# Patient Record
Sex: MALE | Race: WHITE | NOT HISPANIC OR LATINO | Employment: FULL TIME | URBAN - METROPOLITAN AREA
[De-identification: names, ages, dates, MRNs, and addresses within clinical notes are randomized per-mention and may not be internally consistent; named-entity substitution may affect disease eponyms.]

---

## 2021-04-12 ENCOUNTER — OFFICE VISIT (OUTPATIENT)
Dept: FAMILY MEDICINE | Facility: CLINIC | Age: 66
End: 2021-04-12
Payer: COMMERCIAL

## 2021-04-12 VITALS
BODY MASS INDEX: 44.1 KG/M2 | HEART RATE: 66 BPM | DIASTOLIC BLOOD PRESSURE: 90 MMHG | HEIGHT: 71 IN | SYSTOLIC BLOOD PRESSURE: 148 MMHG | WEIGHT: 315 LBS | OXYGEN SATURATION: 96 %

## 2021-04-12 DIAGNOSIS — E66.9 OBESITY, UNSPECIFIED CLASSIFICATION, UNSPECIFIED OBESITY TYPE, UNSPECIFIED WHETHER SERIOUS COMORBIDITY PRESENT: ICD-10-CM

## 2021-04-12 DIAGNOSIS — R60.0 BILATERAL LOWER EXTREMITY EDEMA: ICD-10-CM

## 2021-04-12 DIAGNOSIS — G47.33 OSA ON CPAP: ICD-10-CM

## 2021-04-12 DIAGNOSIS — Z83.3 FAMILY HISTORY OF DIABETES MELLITUS (DM): ICD-10-CM

## 2021-04-12 DIAGNOSIS — Z00.00 HEALTHCARE MAINTENANCE: ICD-10-CM

## 2021-04-12 DIAGNOSIS — Z12.11 COLON CANCER SCREENING: ICD-10-CM

## 2021-04-12 DIAGNOSIS — R03.0 ELEVATED BLOOD PRESSURE READING IN OFFICE WITHOUT DIAGNOSIS OF HYPERTENSION: ICD-10-CM

## 2021-04-12 DIAGNOSIS — Z82.49 FAMILY HISTORY OF HEART DISEASE: ICD-10-CM

## 2021-04-12 DIAGNOSIS — Z23 NEED FOR DIPHTHERIA-TETANUS-PERTUSSIS (TDAP) VACCINE: ICD-10-CM

## 2021-04-12 DIAGNOSIS — Z76.89 ENCOUNTER TO ESTABLISH CARE WITH NEW DOCTOR: ICD-10-CM

## 2021-04-12 DIAGNOSIS — Z12.5 PROSTATE CANCER SCREENING: ICD-10-CM

## 2021-04-12 DIAGNOSIS — Z00.00 ANNUAL PHYSICAL EXAM: Primary | ICD-10-CM

## 2021-04-12 PROCEDURE — 99386 PR PREVENTIVE VISIT,NEW,40-64: ICD-10-PCS | Mod: 25,S$GLB,, | Performed by: INTERNAL MEDICINE

## 2021-04-12 PROCEDURE — 90471 IMMUNIZATION ADMIN: CPT | Mod: S$GLB,,, | Performed by: INTERNAL MEDICINE

## 2021-04-12 PROCEDURE — 3008F PR BODY MASS INDEX (BMI) DOCUMENTED: ICD-10-PCS | Mod: CPTII,S$GLB,, | Performed by: INTERNAL MEDICINE

## 2021-04-12 PROCEDURE — 99999 PR PBB SHADOW E&M-NEW PATIENT-LVL III: ICD-10-PCS | Mod: PBBFAC,,, | Performed by: INTERNAL MEDICINE

## 2021-04-12 PROCEDURE — 99999 PR PBB SHADOW E&M-NEW PATIENT-LVL III: CPT | Mod: PBBFAC,,, | Performed by: INTERNAL MEDICINE

## 2021-04-12 PROCEDURE — 99386 PREV VISIT NEW AGE 40-64: CPT | Mod: 25,S$GLB,, | Performed by: INTERNAL MEDICINE

## 2021-04-12 PROCEDURE — 90715 TDAP VACCINE 7 YRS/> IM: CPT | Mod: S$GLB,,, | Performed by: INTERNAL MEDICINE

## 2021-04-12 PROCEDURE — 90471 TDAP VACCINE GREATER THAN OR EQUAL TO 7YO IM: ICD-10-PCS | Mod: S$GLB,,, | Performed by: INTERNAL MEDICINE

## 2021-04-12 PROCEDURE — 90715 TDAP VACCINE GREATER THAN OR EQUAL TO 7YO IM: ICD-10-PCS | Mod: S$GLB,,, | Performed by: INTERNAL MEDICINE

## 2021-04-12 PROCEDURE — 3008F BODY MASS INDEX DOCD: CPT | Mod: CPTII,S$GLB,, | Performed by: INTERNAL MEDICINE

## 2021-04-12 RX ORDER — IBUPROFEN 100 MG/5ML
1000 SUSPENSION, ORAL (FINAL DOSE FORM) ORAL DAILY
COMMUNITY

## 2021-04-13 ENCOUNTER — TELEPHONE (OUTPATIENT)
Dept: GASTROENTEROLOGY | Facility: CLINIC | Age: 66
End: 2021-04-13

## 2021-04-15 ENCOUNTER — TELEPHONE (OUTPATIENT)
Dept: GASTROENTEROLOGY | Facility: CLINIC | Age: 66
End: 2021-04-15

## 2021-04-15 DIAGNOSIS — Z20.822 ENCOUNTER FOR LABORATORY TESTING FOR COVID-19 VIRUS: ICD-10-CM

## 2021-04-18 PROBLEM — R60.0 BILATERAL LOWER EXTREMITY EDEMA: Status: ACTIVE | Noted: 2021-04-18

## 2021-04-18 PROBLEM — R03.0 ELEVATED BLOOD PRESSURE READING IN OFFICE WITHOUT DIAGNOSIS OF HYPERTENSION: Status: ACTIVE | Noted: 2021-04-18

## 2021-04-18 PROBLEM — Z83.3 FAMILY HISTORY OF DIABETES MELLITUS (DM): Status: ACTIVE | Noted: 2021-04-18

## 2021-04-18 PROBLEM — Z82.49 FAMILY HISTORY OF HEART DISEASE: Status: ACTIVE | Noted: 2021-04-18

## 2021-04-27 ENCOUNTER — TELEPHONE (OUTPATIENT)
Dept: GASTROENTEROLOGY | Facility: CLINIC | Age: 66
End: 2021-04-27

## 2021-05-07 ENCOUNTER — LAB VISIT (OUTPATIENT)
Dept: LAB | Facility: HOSPITAL | Age: 66
End: 2021-05-07
Attending: INTERNAL MEDICINE
Payer: COMMERCIAL

## 2021-05-07 DIAGNOSIS — Z00.00 HEALTHCARE MAINTENANCE: ICD-10-CM

## 2021-05-07 LAB
ALBUMIN SERPL BCP-MCNC: 3.7 G/DL (ref 3.5–5.2)
ALP SERPL-CCNC: 58 U/L (ref 55–135)
ALT SERPL W/O P-5'-P-CCNC: 53 U/L (ref 10–44)
ANION GAP SERPL CALC-SCNC: 7 MMOL/L (ref 8–16)
AST SERPL-CCNC: 46 U/L (ref 10–40)
BASOPHILS # BLD AUTO: 0.05 K/UL (ref 0–0.2)
BASOPHILS NFR BLD: 0.8 % (ref 0–1.9)
BILIRUB SERPL-MCNC: 0.6 MG/DL (ref 0.1–1)
BUN SERPL-MCNC: 23 MG/DL (ref 8–23)
CALCIUM SERPL-MCNC: 9.2 MG/DL (ref 8.7–10.5)
CHLORIDE SERPL-SCNC: 107 MMOL/L (ref 95–110)
CHOLEST SERPL-MCNC: 250 MG/DL (ref 120–199)
CHOLEST/HDLC SERPL: 6.1 {RATIO} (ref 2–5)
CO2 SERPL-SCNC: 28 MMOL/L (ref 23–29)
COMPLEXED PSA SERPL-MCNC: 1.2 NG/ML (ref 0–4)
CREAT SERPL-MCNC: 1 MG/DL (ref 0.5–1.4)
DIFFERENTIAL METHOD: ABNORMAL
EOSINOPHIL # BLD AUTO: 0.2 K/UL (ref 0–0.5)
EOSINOPHIL NFR BLD: 3 % (ref 0–8)
ERYTHROCYTE [DISTWIDTH] IN BLOOD BY AUTOMATED COUNT: 12.9 % (ref 11.5–14.5)
EST. GFR  (AFRICAN AMERICAN): >60 ML/MIN/1.73 M^2
EST. GFR  (NON AFRICAN AMERICAN): >60 ML/MIN/1.73 M^2
ESTIMATED AVG GLUCOSE: 114 MG/DL (ref 68–131)
GLUCOSE SERPL-MCNC: 120 MG/DL (ref 70–110)
HBA1C MFR BLD: 5.6 % (ref 4–5.6)
HCT VFR BLD AUTO: 48.2 % (ref 40–54)
HDLC SERPL-MCNC: 41 MG/DL (ref 40–75)
HDLC SERPL: 16.4 % (ref 20–50)
HGB BLD-MCNC: 15.6 G/DL (ref 14–18)
IMM GRANULOCYTES # BLD AUTO: 0.05 K/UL (ref 0–0.04)
IMM GRANULOCYTES NFR BLD AUTO: 0.8 % (ref 0–0.5)
LDLC SERPL CALC-MCNC: 168.8 MG/DL (ref 63–159)
LYMPHOCYTES # BLD AUTO: 2.4 K/UL (ref 1–4.8)
LYMPHOCYTES NFR BLD: 36.2 % (ref 18–48)
MCH RBC QN AUTO: 31.3 PG (ref 27–31)
MCHC RBC AUTO-ENTMCNC: 32.4 G/DL (ref 32–36)
MCV RBC AUTO: 97 FL (ref 82–98)
MONOCYTES # BLD AUTO: 0.8 K/UL (ref 0.3–1)
MONOCYTES NFR BLD: 12 % (ref 4–15)
NEUTROPHILS # BLD AUTO: 3.2 K/UL (ref 1.8–7.7)
NEUTROPHILS NFR BLD: 47.2 % (ref 38–73)
NONHDLC SERPL-MCNC: 209 MG/DL
NRBC BLD-RTO: 0 /100 WBC
PLATELET # BLD AUTO: 140 K/UL (ref 150–450)
PMV BLD AUTO: 11 FL (ref 9.2–12.9)
POTASSIUM SERPL-SCNC: 4.6 MMOL/L (ref 3.5–5.1)
PROT SERPL-MCNC: 7.2 G/DL (ref 6–8.4)
RBC # BLD AUTO: 4.99 M/UL (ref 4.6–6.2)
SODIUM SERPL-SCNC: 142 MMOL/L (ref 136–145)
TRIGL SERPL-MCNC: 201 MG/DL (ref 30–150)
TSH SERPL DL<=0.005 MIU/L-ACNC: 1.22 UIU/ML (ref 0.4–4)
WBC # BLD AUTO: 6.66 K/UL (ref 3.9–12.7)

## 2021-05-07 PROCEDURE — 86803 HEPATITIS C AB TEST: CPT | Performed by: INTERNAL MEDICINE

## 2021-05-07 PROCEDURE — 80061 LIPID PANEL: CPT | Performed by: INTERNAL MEDICINE

## 2021-05-07 PROCEDURE — 83036 HEMOGLOBIN GLYCOSYLATED A1C: CPT | Performed by: INTERNAL MEDICINE

## 2021-05-07 PROCEDURE — 80053 COMPREHEN METABOLIC PANEL: CPT | Performed by: INTERNAL MEDICINE

## 2021-05-07 PROCEDURE — 84153 ASSAY OF PSA TOTAL: CPT | Performed by: INTERNAL MEDICINE

## 2021-05-07 PROCEDURE — 85025 COMPLETE CBC W/AUTO DIFF WBC: CPT | Performed by: INTERNAL MEDICINE

## 2021-05-07 PROCEDURE — 36415 COLL VENOUS BLD VENIPUNCTURE: CPT | Mod: PO | Performed by: INTERNAL MEDICINE

## 2021-05-07 PROCEDURE — 84443 ASSAY THYROID STIM HORMONE: CPT | Performed by: INTERNAL MEDICINE

## 2021-05-10 LAB — HCV AB SERPL QL IA: NEGATIVE

## 2021-06-10 ENCOUNTER — TELEPHONE (OUTPATIENT)
Dept: GASTROENTEROLOGY | Facility: CLINIC | Age: 66
End: 2021-06-10

## 2021-06-18 ENCOUNTER — HOSPITAL ENCOUNTER (OUTPATIENT)
Facility: HOSPITAL | Age: 66
Discharge: HOME | End: 2021-06-18
Attending: INTERNAL MEDICINE | Admitting: INTERNAL MEDICINE
Payer: COMMERCIAL

## 2021-06-18 ENCOUNTER — ANESTHESIA (OUTPATIENT)
Dept: ENDOSCOPY | Facility: HOSPITAL | Age: 66
End: 2021-06-18
Payer: COMMERCIAL

## 2021-06-18 ENCOUNTER — ANESTHESIA EVENT (OUTPATIENT)
Dept: ENDOSCOPY | Facility: HOSPITAL | Age: 66
End: 2021-06-18
Payer: COMMERCIAL

## 2021-06-18 VITALS
BODY MASS INDEX: 44.1 KG/M2 | RESPIRATION RATE: 16 BRPM | HEART RATE: 78 BPM | TEMPERATURE: 98 F | OXYGEN SATURATION: 96 % | DIASTOLIC BLOOD PRESSURE: 70 MMHG | SYSTOLIC BLOOD PRESSURE: 120 MMHG | WEIGHT: 315 LBS | HEIGHT: 71 IN

## 2021-06-18 DIAGNOSIS — Z12.11 SCREEN FOR COLON CANCER: ICD-10-CM

## 2021-06-18 PROCEDURE — 37000008 HC ANESTHESIA 1ST 15 MINUTES: Mod: PO | Performed by: INTERNAL MEDICINE

## 2021-06-18 PROCEDURE — D9220A PRA ANESTHESIA: ICD-10-PCS | Mod: 33,CRNA,, | Performed by: NURSE ANESTHETIST, CERTIFIED REGISTERED

## 2021-06-18 PROCEDURE — 88305 TISSUE EXAM BY PATHOLOGIST: CPT | Performed by: PATHOLOGY

## 2021-06-18 PROCEDURE — 25000003 PHARM REV CODE 250: Mod: PO | Performed by: NURSE ANESTHETIST, CERTIFIED REGISTERED

## 2021-06-18 PROCEDURE — 88305 TISSUE EXAM BY PATHOLOGIST: CPT | Mod: 26,,, | Performed by: PATHOLOGY

## 2021-06-18 PROCEDURE — 45385 PR COLONOSCOPY,REMV LESN,SNARE: ICD-10-PCS | Mod: 33,,, | Performed by: INTERNAL MEDICINE

## 2021-06-18 PROCEDURE — 27201089 HC SNARE, DISP (ANY): Mod: PO | Performed by: INTERNAL MEDICINE

## 2021-06-18 PROCEDURE — 88305 TISSUE EXAM BY PATHOLOGIST: ICD-10-PCS | Mod: 26,,, | Performed by: PATHOLOGY

## 2021-06-18 PROCEDURE — 63600175 PHARM REV CODE 636 W HCPCS: Mod: PO | Performed by: NURSE ANESTHETIST, CERTIFIED REGISTERED

## 2021-06-18 PROCEDURE — 37000009 HC ANESTHESIA EA ADD 15 MINS: Mod: PO | Performed by: INTERNAL MEDICINE

## 2021-06-18 PROCEDURE — 45385 COLONOSCOPY W/LESION REMOVAL: CPT | Mod: PO | Performed by: INTERNAL MEDICINE

## 2021-06-18 PROCEDURE — D9220A PRA ANESTHESIA: Mod: 33,CRNA,, | Performed by: NURSE ANESTHETIST, CERTIFIED REGISTERED

## 2021-06-18 PROCEDURE — D9220A PRA ANESTHESIA: Mod: 33,ANES,, | Performed by: ANESTHESIOLOGY

## 2021-06-18 PROCEDURE — 63600175 PHARM REV CODE 636 W HCPCS: Mod: PO | Performed by: INTERNAL MEDICINE

## 2021-06-18 PROCEDURE — D9220A PRA ANESTHESIA: ICD-10-PCS | Mod: 33,ANES,, | Performed by: ANESTHESIOLOGY

## 2021-06-18 PROCEDURE — 45385 COLONOSCOPY W/LESION REMOVAL: CPT | Mod: 33,,, | Performed by: INTERNAL MEDICINE

## 2021-06-18 RX ORDER — LIDOCAINE HCL/PF 100 MG/5ML
SYRINGE (ML) INTRAVENOUS
Status: DISCONTINUED | OUTPATIENT
Start: 2021-06-18 | End: 2021-06-18

## 2021-06-18 RX ORDER — PROPOFOL 10 MG/ML
VIAL (ML) INTRAVENOUS
Status: DISCONTINUED | OUTPATIENT
Start: 2021-06-18 | End: 2021-06-18

## 2021-06-18 RX ORDER — SODIUM CHLORIDE 0.9 % (FLUSH) 0.9 %
10 SYRINGE (ML) INJECTION
Status: DISCONTINUED | OUTPATIENT
Start: 2021-06-18 | End: 2021-06-18 | Stop reason: HOSPADM

## 2021-06-18 RX ORDER — SODIUM CHLORIDE, SODIUM LACTATE, POTASSIUM CHLORIDE, CALCIUM CHLORIDE 600; 310; 30; 20 MG/100ML; MG/100ML; MG/100ML; MG/100ML
INJECTION, SOLUTION INTRAVENOUS CONTINUOUS
Status: DISCONTINUED | OUTPATIENT
Start: 2021-06-18 | End: 2021-06-18 | Stop reason: HOSPADM

## 2021-06-18 RX ADMIN — PROPOFOL 30 MG: 10 INJECTION, EMULSION INTRAVENOUS at 09:06

## 2021-06-18 RX ADMIN — PROPOFOL 30 MG: 10 INJECTION, EMULSION INTRAVENOUS at 10:06

## 2021-06-18 RX ADMIN — SODIUM CHLORIDE, SODIUM LACTATE, POTASSIUM CHLORIDE, AND CALCIUM CHLORIDE: .6; .31; .03; .02 INJECTION, SOLUTION INTRAVENOUS at 09:06

## 2021-06-18 RX ADMIN — LIDOCAINE HYDROCHLORIDE 100 MG: 20 INJECTION, SOLUTION INTRAVENOUS at 09:06

## 2021-06-25 ENCOUNTER — OFFICE VISIT (OUTPATIENT)
Dept: FAMILY MEDICINE | Facility: CLINIC | Age: 66
End: 2021-06-25
Payer: COMMERCIAL

## 2021-06-25 VITALS
DIASTOLIC BLOOD PRESSURE: 84 MMHG | WEIGHT: 315 LBS | TEMPERATURE: 99 F | BODY MASS INDEX: 44.1 KG/M2 | SYSTOLIC BLOOD PRESSURE: 128 MMHG | HEART RATE: 60 BPM | HEIGHT: 71 IN

## 2021-06-25 DIAGNOSIS — G47.33 OSA ON CPAP: ICD-10-CM

## 2021-06-25 DIAGNOSIS — Z83.3 FAMILY HISTORY OF DIABETES MELLITUS (DM): ICD-10-CM

## 2021-06-25 DIAGNOSIS — T88.7XXA SIDE EFFECT OF MEDICATION: ICD-10-CM

## 2021-06-25 DIAGNOSIS — I10 ESSENTIAL HYPERTENSION: Primary | ICD-10-CM

## 2021-06-25 DIAGNOSIS — D69.6 THROMBOCYTOPENIA: ICD-10-CM

## 2021-06-25 DIAGNOSIS — E66.01 CLASS 3 SEVERE OBESITY WITH BODY MASS INDEX (BMI) OF 40.0 TO 44.9 IN ADULT, UNSPECIFIED OBESITY TYPE, UNSPECIFIED WHETHER SERIOUS COMORBIDITY PRESENT: ICD-10-CM

## 2021-06-25 DIAGNOSIS — Z82.49 FAMILY HISTORY OF HEART DISEASE: ICD-10-CM

## 2021-06-25 DIAGNOSIS — R74.01 TRANSAMINITIS: ICD-10-CM

## 2021-06-25 DIAGNOSIS — R60.0 BILATERAL LOWER EXTREMITY EDEMA: ICD-10-CM

## 2021-06-25 DIAGNOSIS — R73.01 IMPAIRED FASTING GLUCOSE: ICD-10-CM

## 2021-06-25 DIAGNOSIS — E78.2 MIXED HYPERLIPIDEMIA: ICD-10-CM

## 2021-06-25 DIAGNOSIS — Z12.11 SCREEN FOR COLON CANCER: ICD-10-CM

## 2021-06-25 DIAGNOSIS — K63.5 POLYP OF COLON, UNSPECIFIED PART OF COLON, UNSPECIFIED TYPE: ICD-10-CM

## 2021-06-25 PROCEDURE — 99215 OFFICE O/P EST HI 40 MIN: CPT | Mod: S$GLB,,, | Performed by: INTERNAL MEDICINE

## 2021-06-25 PROCEDURE — 99999 PR PBB SHADOW E&M-EST. PATIENT-LVL IV: ICD-10-PCS | Mod: PBBFAC,,, | Performed by: INTERNAL MEDICINE

## 2021-06-25 PROCEDURE — 99999 PR PBB SHADOW E&M-EST. PATIENT-LVL IV: CPT | Mod: PBBFAC,,, | Performed by: INTERNAL MEDICINE

## 2021-06-25 PROCEDURE — 99215 PR OFFICE/OUTPT VISIT, EST, LEVL V, 40-54 MIN: ICD-10-PCS | Mod: S$GLB,,, | Performed by: INTERNAL MEDICINE

## 2021-06-25 RX ORDER — TELMISARTAN 40 MG/1
40 TABLET ORAL DAILY
Qty: 30 TABLET | Refills: 2 | Status: SHIPPED | OUTPATIENT
Start: 2021-06-25 | End: 2023-07-27

## 2021-06-25 RX ORDER — ATORVASTATIN CALCIUM 10 MG/1
10 TABLET, FILM COATED ORAL DAILY
Qty: 90 TABLET | Refills: 3 | Status: SHIPPED | OUTPATIENT
Start: 2021-06-25 | End: 2023-07-27

## 2021-06-29 LAB
FINAL PATHOLOGIC DIAGNOSIS: NORMAL
Lab: NORMAL

## 2021-07-26 ENCOUNTER — TELEPHONE (OUTPATIENT)
Dept: FAMILY MEDICINE | Facility: CLINIC | Age: 66
End: 2021-07-26

## 2021-07-30 ENCOUNTER — OFFICE VISIT (OUTPATIENT)
Dept: FAMILY MEDICINE | Facility: CLINIC | Age: 66
End: 2021-07-30
Payer: COMMERCIAL

## 2021-07-30 VITALS
OXYGEN SATURATION: 97 % | HEIGHT: 70 IN | TEMPERATURE: 99 F | DIASTOLIC BLOOD PRESSURE: 78 MMHG | SYSTOLIC BLOOD PRESSURE: 126 MMHG | WEIGHT: 315 LBS | BODY MASS INDEX: 45.1 KG/M2 | HEART RATE: 70 BPM

## 2021-07-30 DIAGNOSIS — E66.01 MORBID OBESITY: ICD-10-CM

## 2021-07-30 DIAGNOSIS — E86.0 DEHYDRATION: ICD-10-CM

## 2021-07-30 DIAGNOSIS — E78.2 MIXED HYPERLIPIDEMIA: ICD-10-CM

## 2021-07-30 DIAGNOSIS — R60.0 BILATERAL LOWER EXTREMITY EDEMA: ICD-10-CM

## 2021-07-30 DIAGNOSIS — R07.89 CHEST TIGHTNESS: ICD-10-CM

## 2021-07-30 DIAGNOSIS — I95.9 HYPOTENSION, UNSPECIFIED HYPOTENSION TYPE: ICD-10-CM

## 2021-07-30 DIAGNOSIS — Z09 HOSPITAL DISCHARGE FOLLOW-UP: Primary | ICD-10-CM

## 2021-07-30 DIAGNOSIS — B34.9 ACUTE VIRAL SYNDROME: ICD-10-CM

## 2021-07-30 DIAGNOSIS — R06.02 SHORTNESS OF BREATH: ICD-10-CM

## 2021-07-30 DIAGNOSIS — D69.6 THROMBOCYTOPENIA: ICD-10-CM

## 2021-07-30 DIAGNOSIS — G47.33 OBSTRUCTIVE SLEEP APNEA ON CPAP: ICD-10-CM

## 2021-07-30 DIAGNOSIS — J20.9 ACUTE BRONCHITIS WITH WHEEZING: ICD-10-CM

## 2021-07-30 DIAGNOSIS — Z01.89 ENCOUNTER FOR LABORATORY TEST: ICD-10-CM

## 2021-07-30 PROCEDURE — 99999 PR PBB SHADOW E&M-EST. PATIENT-LVL IV: CPT | Mod: PBBFAC,,, | Performed by: INTERNAL MEDICINE

## 2021-07-30 PROCEDURE — 1159F MED LIST DOCD IN RCRD: CPT | Mod: CPTII,S$GLB,, | Performed by: INTERNAL MEDICINE

## 2021-07-30 PROCEDURE — 3078F DIAST BP <80 MM HG: CPT | Mod: CPTII,S$GLB,, | Performed by: INTERNAL MEDICINE

## 2021-07-30 PROCEDURE — 1126F PR PAIN SEVERITY QUANTIFIED, NO PAIN PRESENT: ICD-10-PCS | Mod: CPTII,S$GLB,, | Performed by: INTERNAL MEDICINE

## 2021-07-30 PROCEDURE — 99214 OFFICE O/P EST MOD 30 MIN: CPT | Mod: S$GLB,,, | Performed by: INTERNAL MEDICINE

## 2021-07-30 PROCEDURE — 1126F AMNT PAIN NOTED NONE PRSNT: CPT | Mod: CPTII,S$GLB,, | Performed by: INTERNAL MEDICINE

## 2021-07-30 PROCEDURE — 3074F SYST BP LT 130 MM HG: CPT | Mod: CPTII,S$GLB,, | Performed by: INTERNAL MEDICINE

## 2021-07-30 PROCEDURE — 3044F HG A1C LEVEL LT 7.0%: CPT | Mod: CPTII,S$GLB,, | Performed by: INTERNAL MEDICINE

## 2021-07-30 PROCEDURE — 3074F PR MOST RECENT SYSTOLIC BLOOD PRESSURE < 130 MM HG: ICD-10-PCS | Mod: CPTII,S$GLB,, | Performed by: INTERNAL MEDICINE

## 2021-07-30 PROCEDURE — 3008F BODY MASS INDEX DOCD: CPT | Mod: CPTII,S$GLB,, | Performed by: INTERNAL MEDICINE

## 2021-07-30 PROCEDURE — 99214 PR OFFICE/OUTPT VISIT, EST, LEVL IV, 30-39 MIN: ICD-10-PCS | Mod: S$GLB,,, | Performed by: INTERNAL MEDICINE

## 2021-07-30 PROCEDURE — 99999 PR PBB SHADOW E&M-EST. PATIENT-LVL IV: ICD-10-PCS | Mod: PBBFAC,,, | Performed by: INTERNAL MEDICINE

## 2021-07-30 PROCEDURE — 3078F PR MOST RECENT DIASTOLIC BLOOD PRESSURE < 80 MM HG: ICD-10-PCS | Mod: CPTII,S$GLB,, | Performed by: INTERNAL MEDICINE

## 2021-07-30 PROCEDURE — 1160F PR REVIEW ALL MEDS BY PRESCRIBER/CLIN PHARMACIST DOCUMENTED: ICD-10-PCS | Mod: CPTII,S$GLB,, | Performed by: INTERNAL MEDICINE

## 2021-07-30 PROCEDURE — 1159F PR MEDICATION LIST DOCUMENTED IN MEDICAL RECORD: ICD-10-PCS | Mod: CPTII,S$GLB,, | Performed by: INTERNAL MEDICINE

## 2021-07-30 PROCEDURE — 1160F RVW MEDS BY RX/DR IN RCRD: CPT | Mod: CPTII,S$GLB,, | Performed by: INTERNAL MEDICINE

## 2021-07-30 PROCEDURE — 3044F PR MOST RECENT HEMOGLOBIN A1C LEVEL <7.0%: ICD-10-PCS | Mod: CPTII,S$GLB,, | Performed by: INTERNAL MEDICINE

## 2021-07-30 PROCEDURE — 3008F PR BODY MASS INDEX (BMI) DOCUMENTED: ICD-10-PCS | Mod: CPTII,S$GLB,, | Performed by: INTERNAL MEDICINE

## 2021-08-15 ENCOUNTER — TELEPHONE (OUTPATIENT)
Dept: FAMILY MEDICINE | Facility: CLINIC | Age: 66
End: 2021-08-15

## 2021-08-27 ENCOUNTER — LAB VISIT (OUTPATIENT)
Dept: LAB | Facility: HOSPITAL | Age: 66
End: 2021-08-27
Attending: INTERNAL MEDICINE
Payer: COMMERCIAL

## 2021-08-27 DIAGNOSIS — D69.6 THROMBOCYTOPENIA: ICD-10-CM

## 2021-08-27 DIAGNOSIS — E78.2 MIXED HYPERLIPIDEMIA: ICD-10-CM

## 2021-08-27 DIAGNOSIS — Z82.49 FAMILY HISTORY OF HEART DISEASE: ICD-10-CM

## 2021-08-27 DIAGNOSIS — R74.01 TRANSAMINITIS: ICD-10-CM

## 2021-08-27 DIAGNOSIS — I10 ESSENTIAL HYPERTENSION: ICD-10-CM

## 2021-08-27 LAB
ALBUMIN SERPL BCP-MCNC: 3.8 G/DL (ref 3.5–5.2)
ALP SERPL-CCNC: 52 U/L (ref 55–135)
ALT SERPL W/O P-5'-P-CCNC: 31 U/L (ref 10–44)
ANION GAP SERPL CALC-SCNC: 9 MMOL/L (ref 8–16)
AST SERPL-CCNC: 32 U/L (ref 10–40)
BASOPHILS # BLD AUTO: 0.05 K/UL (ref 0–0.2)
BASOPHILS NFR BLD: 0.7 % (ref 0–1.9)
BILIRUB SERPL-MCNC: 0.9 MG/DL (ref 0.1–1)
BUN SERPL-MCNC: 14 MG/DL (ref 8–23)
CALCIUM SERPL-MCNC: 9.3 MG/DL (ref 8.7–10.5)
CHLORIDE SERPL-SCNC: 107 MMOL/L (ref 95–110)
CHOLEST SERPL-MCNC: 169 MG/DL (ref 120–199)
CHOLEST/HDLC SERPL: 4.3 {RATIO} (ref 2–5)
CO2 SERPL-SCNC: 26 MMOL/L (ref 23–29)
CREAT SERPL-MCNC: 0.8 MG/DL (ref 0.5–1.4)
DIFFERENTIAL METHOD: ABNORMAL
EOSINOPHIL # BLD AUTO: 0.2 K/UL (ref 0–0.5)
EOSINOPHIL NFR BLD: 2.9 % (ref 0–8)
ERYTHROCYTE [DISTWIDTH] IN BLOOD BY AUTOMATED COUNT: 13.2 % (ref 11.5–14.5)
EST. GFR  (AFRICAN AMERICAN): >60 ML/MIN/1.73 M^2
EST. GFR  (NON AFRICAN AMERICAN): >60 ML/MIN/1.73 M^2
GLUCOSE SERPL-MCNC: 123 MG/DL (ref 70–110)
HBV CORE IGM SERPL QL IA: NEGATIVE
HBV SURFACE AG SERPL QL IA: NEGATIVE
HCT VFR BLD AUTO: 43.2 % (ref 40–54)
HCV AB SERPL QL IA: NEGATIVE
HDLC SERPL-MCNC: 39 MG/DL (ref 40–75)
HDLC SERPL: 23.1 % (ref 20–50)
HGB BLD-MCNC: 14.5 G/DL (ref 14–18)
IMM GRANULOCYTES # BLD AUTO: 0.05 K/UL (ref 0–0.04)
IMM GRANULOCYTES NFR BLD AUTO: 0.7 % (ref 0–0.5)
LDLC SERPL CALC-MCNC: 97.6 MG/DL (ref 63–159)
LYMPHOCYTES # BLD AUTO: 2.5 K/UL (ref 1–4.8)
LYMPHOCYTES NFR BLD: 33 % (ref 18–48)
MCH RBC QN AUTO: 31.7 PG (ref 27–31)
MCHC RBC AUTO-ENTMCNC: 33.6 G/DL (ref 32–36)
MCV RBC AUTO: 95 FL (ref 82–98)
MONOCYTES # BLD AUTO: 0.8 K/UL (ref 0.3–1)
MONOCYTES NFR BLD: 10.3 % (ref 4–15)
NEUTROPHILS # BLD AUTO: 4 K/UL (ref 1.8–7.7)
NEUTROPHILS NFR BLD: 52.4 % (ref 38–73)
NONHDLC SERPL-MCNC: 130 MG/DL
NRBC BLD-RTO: 0 /100 WBC
PLATELET # BLD AUTO: 148 K/UL (ref 150–450)
PMV BLD AUTO: 10.7 FL (ref 9.2–12.9)
POTASSIUM SERPL-SCNC: 4.6 MMOL/L (ref 3.5–5.1)
PROT SERPL-MCNC: 7.1 G/DL (ref 6–8.4)
RBC # BLD AUTO: 4.57 M/UL (ref 4.6–6.2)
SODIUM SERPL-SCNC: 142 MMOL/L (ref 136–145)
TRIGL SERPL-MCNC: 162 MG/DL (ref 30–150)
WBC # BLD AUTO: 7.66 K/UL (ref 3.9–12.7)

## 2021-08-27 PROCEDURE — 36415 COLL VENOUS BLD VENIPUNCTURE: CPT | Mod: PO | Performed by: INTERNAL MEDICINE

## 2021-08-27 PROCEDURE — 80053 COMPREHEN METABOLIC PANEL: CPT | Performed by: INTERNAL MEDICINE

## 2021-08-27 PROCEDURE — 86803 HEPATITIS C AB TEST: CPT | Performed by: INTERNAL MEDICINE

## 2021-08-27 PROCEDURE — 80061 LIPID PANEL: CPT | Performed by: INTERNAL MEDICINE

## 2021-08-27 PROCEDURE — 86705 HEP B CORE ANTIBODY IGM: CPT | Performed by: INTERNAL MEDICINE

## 2021-08-27 PROCEDURE — 85025 COMPLETE CBC W/AUTO DIFF WBC: CPT | Performed by: INTERNAL MEDICINE

## 2021-08-27 PROCEDURE — 87340 HEPATITIS B SURFACE AG IA: CPT | Performed by: INTERNAL MEDICINE

## 2021-09-07 ENCOUNTER — OFFICE VISIT (OUTPATIENT)
Dept: CARDIOLOGY | Facility: CLINIC | Age: 66
End: 2021-09-07
Payer: COMMERCIAL

## 2021-09-07 VITALS
SYSTOLIC BLOOD PRESSURE: 148 MMHG | HEART RATE: 68 BPM | WEIGHT: 314.81 LBS | DIASTOLIC BLOOD PRESSURE: 93 MMHG | HEIGHT: 70 IN | BODY MASS INDEX: 45.07 KG/M2

## 2021-09-07 DIAGNOSIS — E78.2 MIXED HYPERLIPIDEMIA: ICD-10-CM

## 2021-09-07 DIAGNOSIS — Z82.49 FAMILY HISTORY OF HEART DISEASE: ICD-10-CM

## 2021-09-07 DIAGNOSIS — E66.01 CLASS 2 SEVERE OBESITY DUE TO EXCESS CALORIES WITH SERIOUS COMORBIDITY IN ADULT, UNSPECIFIED BMI: Primary | ICD-10-CM

## 2021-09-07 DIAGNOSIS — I10 ESSENTIAL HYPERTENSION: ICD-10-CM

## 2021-09-07 PROCEDURE — 99999 PR PBB SHADOW E&M-EST. PATIENT-LVL III: ICD-10-PCS | Mod: PBBFAC,,, | Performed by: INTERNAL MEDICINE

## 2021-09-07 PROCEDURE — 3044F PR MOST RECENT HEMOGLOBIN A1C LEVEL <7.0%: ICD-10-PCS | Mod: CPTII,S$GLB,, | Performed by: INTERNAL MEDICINE

## 2021-09-07 PROCEDURE — 1160F RVW MEDS BY RX/DR IN RCRD: CPT | Mod: CPTII,S$GLB,, | Performed by: INTERNAL MEDICINE

## 2021-09-07 PROCEDURE — 1160F PR REVIEW ALL MEDS BY PRESCRIBER/CLIN PHARMACIST DOCUMENTED: ICD-10-PCS | Mod: CPTII,S$GLB,, | Performed by: INTERNAL MEDICINE

## 2021-09-07 PROCEDURE — 99999 PR PBB SHADOW E&M-EST. PATIENT-LVL III: CPT | Mod: PBBFAC,,, | Performed by: INTERNAL MEDICINE

## 2021-09-07 PROCEDURE — 3008F BODY MASS INDEX DOCD: CPT | Mod: CPTII,S$GLB,, | Performed by: INTERNAL MEDICINE

## 2021-09-07 PROCEDURE — 99204 OFFICE O/P NEW MOD 45 MIN: CPT | Mod: S$GLB,,, | Performed by: INTERNAL MEDICINE

## 2021-09-07 PROCEDURE — 3008F PR BODY MASS INDEX (BMI) DOCUMENTED: ICD-10-PCS | Mod: CPTII,S$GLB,, | Performed by: INTERNAL MEDICINE

## 2021-09-07 PROCEDURE — 3077F PR MOST RECENT SYSTOLIC BLOOD PRESSURE >= 140 MM HG: ICD-10-PCS | Mod: CPTII,S$GLB,, | Performed by: INTERNAL MEDICINE

## 2021-09-07 PROCEDURE — 3077F SYST BP >= 140 MM HG: CPT | Mod: CPTII,S$GLB,, | Performed by: INTERNAL MEDICINE

## 2021-09-07 PROCEDURE — 1159F PR MEDICATION LIST DOCUMENTED IN MEDICAL RECORD: ICD-10-PCS | Mod: CPTII,S$GLB,, | Performed by: INTERNAL MEDICINE

## 2021-09-07 PROCEDURE — 4010F ACE/ARB THERAPY RXD/TAKEN: CPT | Mod: CPTII,S$GLB,, | Performed by: INTERNAL MEDICINE

## 2021-09-07 PROCEDURE — 3080F DIAST BP >= 90 MM HG: CPT | Mod: CPTII,S$GLB,, | Performed by: INTERNAL MEDICINE

## 2021-09-07 PROCEDURE — 4010F PR ACE/ARB THEARPY RXD/TAKEN: ICD-10-PCS | Mod: CPTII,S$GLB,, | Performed by: INTERNAL MEDICINE

## 2021-09-07 PROCEDURE — 99204 PR OFFICE/OUTPT VISIT, NEW, LEVL IV, 45-59 MIN: ICD-10-PCS | Mod: S$GLB,,, | Performed by: INTERNAL MEDICINE

## 2021-09-07 PROCEDURE — 1159F MED LIST DOCD IN RCRD: CPT | Mod: CPTII,S$GLB,, | Performed by: INTERNAL MEDICINE

## 2021-09-07 PROCEDURE — 3080F PR MOST RECENT DIASTOLIC BLOOD PRESSURE >= 90 MM HG: ICD-10-PCS | Mod: CPTII,S$GLB,, | Performed by: INTERNAL MEDICINE

## 2021-09-07 PROCEDURE — 3044F HG A1C LEVEL LT 7.0%: CPT | Mod: CPTII,S$GLB,, | Performed by: INTERNAL MEDICINE

## 2022-02-14 ENCOUNTER — PATIENT MESSAGE (OUTPATIENT)
Dept: ADMINISTRATIVE | Facility: HOSPITAL | Age: 67
End: 2022-02-14

## 2022-02-14 ENCOUNTER — PATIENT OUTREACH (OUTPATIENT)
Dept: ADMINISTRATIVE | Facility: HOSPITAL | Age: 67
End: 2022-02-14

## 2023-07-13 ENCOUNTER — TELEPHONE (OUTPATIENT)
Dept: FAMILY MEDICINE | Facility: CLINIC | Age: 68
End: 2023-07-13
Payer: MEDICARE

## 2023-07-13 NOTE — TELEPHONE ENCOUNTER
----- Message from Sandra Perez LPN sent at 7/13/2023  4:53 PM CDT -----  Regarding: FW: NP APPT  Contact: OSWALD ROCHA [phone]    ----- Message -----  From: Becky Bey  Sent: 7/13/2023   4:10 PM CDT  To: [facility] Staff  Subject: NP APPT                                            Type: Needs Medical Advice      Who Called:  OSWALD ROCHA    Best Call Back Number: [phone] (home)       Additional Information: Patient is calling to speak with nurse/MA regarding scheduling NP Appt. Juliana pt of Dr Cardoza.   Please call back and advise. Thanks

## 2023-07-14 NOTE — TELEPHONE ENCOUNTER
----- Message from Thanh Ojeda sent at 7/14/2023  9:24 AM CDT -----  Contact: self  Type: Sooner Appointment Request        Caller is requesting a sooner appointment. Caller declined first available appointment listed below. Caller will not accept being placed on the waitlist and is requesting a message be sent to doctor.        Name of Caller: Patient   Best Call Back Number: [phone]  Additional Information: Plz call pt to get him scheduled for an est care visit on 7/27/2023 same day as his wife is being seen. Thanks

## 2023-07-19 DIAGNOSIS — I10 ESSENTIAL HYPERTENSION: ICD-10-CM

## 2023-07-27 ENCOUNTER — OFFICE VISIT (OUTPATIENT)
Dept: FAMILY MEDICINE | Facility: CLINIC | Age: 68
End: 2023-07-27
Payer: MEDICARE

## 2023-07-27 VITALS
BODY MASS INDEX: 45.1 KG/M2 | RESPIRATION RATE: 18 BRPM | HEIGHT: 70 IN | SYSTOLIC BLOOD PRESSURE: 128 MMHG | HEART RATE: 63 BPM | DIASTOLIC BLOOD PRESSURE: 76 MMHG | WEIGHT: 315 LBS | OXYGEN SATURATION: 97 %

## 2023-07-27 DIAGNOSIS — Z76.89 ESTABLISHING CARE WITH NEW DOCTOR, ENCOUNTER FOR: ICD-10-CM

## 2023-07-27 DIAGNOSIS — Z86.79 HISTORY OF HYPERTENSION: Primary | ICD-10-CM

## 2023-07-27 DIAGNOSIS — Z71.85 VACCINE COUNSELING: ICD-10-CM

## 2023-07-27 DIAGNOSIS — E78.2 MIXED HYPERLIPIDEMIA: Chronic | ICD-10-CM

## 2023-07-27 DIAGNOSIS — D69.6 THROMBOCYTOPENIA: ICD-10-CM

## 2023-07-27 PROBLEM — R03.0 ELEVATED BLOOD PRESSURE READING IN OFFICE WITHOUT DIAGNOSIS OF HYPERTENSION: Status: RESOLVED | Noted: 2021-04-18 | Resolved: 2023-07-27

## 2023-07-27 PROBLEM — Z12.11 SCREEN FOR COLON CANCER: Status: RESOLVED | Noted: 2021-06-18 | Resolved: 2023-07-27

## 2023-07-27 PROBLEM — R60.0 BILATERAL LOWER EXTREMITY EDEMA: Status: RESOLVED | Noted: 2021-04-18 | Resolved: 2023-07-27

## 2023-07-27 PROBLEM — I10 ESSENTIAL HYPERTENSION: Chronic | Status: ACTIVE | Noted: 2021-09-07

## 2023-07-27 PROBLEM — Z82.49 FAMILY HISTORY OF HEART DISEASE: Chronic | Status: ACTIVE | Noted: 2021-04-18

## 2023-07-27 PROBLEM — Z83.3 FAMILY HISTORY OF DIABETES MELLITUS (DM): Chronic | Status: ACTIVE | Noted: 2021-04-18

## 2023-07-27 PROCEDURE — 1126F AMNT PAIN NOTED NONE PRSNT: CPT | Mod: CPTII,S$GLB,, | Performed by: STUDENT IN AN ORGANIZED HEALTH CARE EDUCATION/TRAINING PROGRAM

## 2023-07-27 PROCEDURE — 1101F PT FALLS ASSESS-DOCD LE1/YR: CPT | Mod: CPTII,S$GLB,, | Performed by: STUDENT IN AN ORGANIZED HEALTH CARE EDUCATION/TRAINING PROGRAM

## 2023-07-27 PROCEDURE — 3078F DIAST BP <80 MM HG: CPT | Mod: CPTII,S$GLB,, | Performed by: STUDENT IN AN ORGANIZED HEALTH CARE EDUCATION/TRAINING PROGRAM

## 2023-07-27 PROCEDURE — 3074F PR MOST RECENT SYSTOLIC BLOOD PRESSURE < 130 MM HG: ICD-10-PCS | Mod: CPTII,S$GLB,, | Performed by: STUDENT IN AN ORGANIZED HEALTH CARE EDUCATION/TRAINING PROGRAM

## 2023-07-27 PROCEDURE — 1126F PR PAIN SEVERITY QUANTIFIED, NO PAIN PRESENT: ICD-10-PCS | Mod: CPTII,S$GLB,, | Performed by: STUDENT IN AN ORGANIZED HEALTH CARE EDUCATION/TRAINING PROGRAM

## 2023-07-27 PROCEDURE — 99397 PER PM REEVAL EST PAT 65+ YR: CPT | Mod: S$GLB,,, | Performed by: STUDENT IN AN ORGANIZED HEALTH CARE EDUCATION/TRAINING PROGRAM

## 2023-07-27 PROCEDURE — 3008F BODY MASS INDEX DOCD: CPT | Mod: CPTII,S$GLB,, | Performed by: STUDENT IN AN ORGANIZED HEALTH CARE EDUCATION/TRAINING PROGRAM

## 2023-07-27 PROCEDURE — 99397 PR PREVENTIVE VISIT,EST,65 & OVER: ICD-10-PCS | Mod: S$GLB,,, | Performed by: STUDENT IN AN ORGANIZED HEALTH CARE EDUCATION/TRAINING PROGRAM

## 2023-07-27 PROCEDURE — 1159F PR MEDICATION LIST DOCUMENTED IN MEDICAL RECORD: ICD-10-PCS | Mod: CPTII,S$GLB,, | Performed by: STUDENT IN AN ORGANIZED HEALTH CARE EDUCATION/TRAINING PROGRAM

## 2023-07-27 PROCEDURE — 99999 PR PBB SHADOW E&M-EST. PATIENT-LVL III: ICD-10-PCS | Mod: PBBFAC,,, | Performed by: STUDENT IN AN ORGANIZED HEALTH CARE EDUCATION/TRAINING PROGRAM

## 2023-07-27 PROCEDURE — 3288F FALL RISK ASSESSMENT DOCD: CPT | Mod: CPTII,S$GLB,, | Performed by: STUDENT IN AN ORGANIZED HEALTH CARE EDUCATION/TRAINING PROGRAM

## 2023-07-27 PROCEDURE — 3288F PR FALLS RISK ASSESSMENT DOCUMENTED: ICD-10-PCS | Mod: CPTII,S$GLB,, | Performed by: STUDENT IN AN ORGANIZED HEALTH CARE EDUCATION/TRAINING PROGRAM

## 2023-07-27 PROCEDURE — 99999 PR PBB SHADOW E&M-EST. PATIENT-LVL III: CPT | Mod: PBBFAC,,, | Performed by: STUDENT IN AN ORGANIZED HEALTH CARE EDUCATION/TRAINING PROGRAM

## 2023-07-27 PROCEDURE — 3008F PR BODY MASS INDEX (BMI) DOCUMENTED: ICD-10-PCS | Mod: CPTII,S$GLB,, | Performed by: STUDENT IN AN ORGANIZED HEALTH CARE EDUCATION/TRAINING PROGRAM

## 2023-07-27 PROCEDURE — 3078F PR MOST RECENT DIASTOLIC BLOOD PRESSURE < 80 MM HG: ICD-10-PCS | Mod: CPTII,S$GLB,, | Performed by: STUDENT IN AN ORGANIZED HEALTH CARE EDUCATION/TRAINING PROGRAM

## 2023-07-27 PROCEDURE — 1160F RVW MEDS BY RX/DR IN RCRD: CPT | Mod: CPTII,S$GLB,, | Performed by: STUDENT IN AN ORGANIZED HEALTH CARE EDUCATION/TRAINING PROGRAM

## 2023-07-27 PROCEDURE — 1101F PR PT FALLS ASSESS DOC 0-1 FALLS W/OUT INJ PAST YR: ICD-10-PCS | Mod: CPTII,S$GLB,, | Performed by: STUDENT IN AN ORGANIZED HEALTH CARE EDUCATION/TRAINING PROGRAM

## 2023-07-27 PROCEDURE — 1160F PR REVIEW ALL MEDS BY PRESCRIBER/CLIN PHARMACIST DOCUMENTED: ICD-10-PCS | Mod: CPTII,S$GLB,, | Performed by: STUDENT IN AN ORGANIZED HEALTH CARE EDUCATION/TRAINING PROGRAM

## 2023-07-27 PROCEDURE — 1159F MED LIST DOCD IN RCRD: CPT | Mod: CPTII,S$GLB,, | Performed by: STUDENT IN AN ORGANIZED HEALTH CARE EDUCATION/TRAINING PROGRAM

## 2023-07-27 PROCEDURE — 3074F SYST BP LT 130 MM HG: CPT | Mod: CPTII,S$GLB,, | Performed by: STUDENT IN AN ORGANIZED HEALTH CARE EDUCATION/TRAINING PROGRAM

## 2023-07-27 RX ORDER — CHLORHEXIDINE GLUCONATE ORAL RINSE 1.2 MG/ML
SOLUTION DENTAL
COMMUNITY

## 2023-07-27 RX ORDER — IBUPROFEN 800 MG/1
TABLET ORAL
COMMUNITY

## 2023-07-27 NOTE — PROGRESS NOTES
Plan:     Diagnoses and all orders for this visit:    History of hypertension  -     Comprehensive Metabolic Panel; Future    Mixed hyperlipidemia  -     Lipid Panel; Future    Thrombocytopenia  -     CBC Auto Differential; Future    BMI 45.0-49.9, adult: Pt doing well w/ dietary and lifestyle changes to help with weight loss, currently down about 20 lbs.    Vaccine counseling: Insurance requires vaccines to be administered through pharmacy - recommended Prevnar 20 and shingles vaccines.     Establishing care with new doctor, encounter for       Follow up in about 6 months (around 1/27/2024), or if symptoms worsen or fail to improve.    Chiquita Marte MD  07/27/2023    Subjective:      Patient ID: Raul Acevedo Sr. is a 67 y.o. male    Chief Complaint   Patient presents with    Annual Exam     HPI  67 y.o. male with a PMHx as documented below presents to clinic today for the following:    Hx of HTN:   - Previous Rx: Telmisartan 40 mg daily   - No longer requiring pharmacologic intervention    HLD:   - Previous Rx: Lipitor 10 mg daily - no longer taking  - Due for repeat labs    Thrombocytopenia:   - Plt count chronically in 140s  - Due for repeat labs  - Pt denies Hx of prior workup    ROS  Constitutional:  Negative for chills and fever.   Respiratory:  Negative for shortness of breath.    Cardiovascular:  Negative for chest pain.   Gastrointestinal:  Negative for abdominal pain, constipation, diarrhea, nausea and vomiting.     Current Outpatient Medications   Medication Instructions    albuterol (PROVENTIL/VENTOLIN HFA) 90 mcg/actuation inhaler 1-2 puffs, Inhalation, Every 6 hours PRN, Rescue    ascorbic acid (vitamin C) (VITAMIN C) 1,000 mg, Oral, Daily    chlorhexidine (PERIDEX) 0.12 % solution chlorhexidine gluconate 0.12 % mouthwash   RINSE AND SPIT WITH 15ML FOR 30 SEC EVERY 12 HOURS    ibuprofen (ADVIL,MOTRIN) 800 MG tablet ibuprofen 800 mg tablet   TAKE 1 TABLET 3 TIMES A DAY    multivit with  "minerals/lutein (MULTIVITAMIN 50 PLUS ORAL) multivitamin   1 tab po q day    vitamin E (AQUASOL E, D-ALPHA TOCOPHEROL, ORAL) vitamin E   2 po q day      Past Medical History:   Diagnosis Date    Essential hypertension 2021    GERD (gastroesophageal reflux disease)     Mixed hyperlipidemia     EDUARDO on CPAP     Thrombocytopenia 2023     Past Surgical History:   Procedure Laterality Date    CARPAL TUNNEL RELEASE Bilateral 2005    CERVICAL SPINE SURGERY      w/ Dr. Cruz in [place], "cadaver bone placed"    COLONOSCOPY      COLONOSCOPY N/A 2021    Procedure: COLONOSCOPY; routine screen; last 10 yrs ago; wnl. Needs update; please ask Dr Solis to perform;  Surgeon: Brent Solis MD;  Location: [facility];  Service: Endoscopy;  Laterality: N/A;     Review of patient's allergies indicates:  No Known Allergies    Family History   Problem Relation Age of Onset    Diabetes Mother     Heart disease Mother         coronary stents; first at her mid-70's.     Diabetes Father     Heart disease Father         Dad  of MI at 50.     Early death Father          of MI at 50's mid    Hypertension Brother     Diabetes Brother     Hyperlipidemia Sister     Hypertension Sister     Hyperlipidemia Sister     Cancer Neg Hx      Social History     Tobacco Use    Smoking status: Never    Smokeless tobacco: Never   Substance Use Topics    Alcohol use: Yes     Comment: Occasionally w special events though    Drug use: Never     Currently on File with [facility]:   Most Recent Immunizations   Administered Date(s) Administered    COVID-19, vector-nr, rS-Ad26, PF ([employer]) 2021    Influenza - Quadrivalent - High Dose - PF (65 years and older) 2022    Influenza - Quadrivalent - PF *Preferred* (6 months and older) 10/23/2021    Tdap 2021     Objective:      Vitals:    23 1305   BP: 128/76   BP Location: Right arm   Patient Position: Sitting   Pulse: 63   Resp: 18   SpO2: 97%   Weight: " "(!) 144.7 kg (319 lb 0.1 oz)   Height: 5' 10" (1.778 m)     Body mass index is 45.77 kg/m².    Physical Exam   Constitutional:       General: No acute distress.  HENT:      Head: Normocephalic and atraumatic.   Pulmonary:      Effort: Pulmonary effort is normal. No respiratory distress.   Neurological:      General: No focal deficit present.      Mental Status: Alert and oriented to person, place, and time. Mental status is at baseline.    Assessment:       1. History of hypertension    2. Mixed hyperlipidemia    3. Thrombocytopenia    4. BMI 45.0-49.9, adult    5. Vaccine counseling    6. Establishing care with new doctor, encounter for        Chiquita Marte MD  [facility]  Office: [phone]   Fax: [phone]  07/27/2023      Disclaimer: This note was partly generated using dictation software which may occasionally result in transcription errors.    Total time spent on this encounter includes face to face time and non-face to face time preparing to see the patient (eg, review of tests), obtaining and/or reviewing separately obtained history, documenting clinical information in the electronic or other health record, independently interpreting results, and communicating results to the patient/family/caregiver, or care coordinator.    "

## 2023-08-03 ENCOUNTER — LAB VISIT (OUTPATIENT)
Dept: LAB | Facility: HOSPITAL | Age: 68
End: 2023-08-03
Attending: INTERNAL MEDICINE
Payer: MEDICARE

## 2023-08-03 DIAGNOSIS — I10 ESSENTIAL HYPERTENSION: ICD-10-CM

## 2023-08-03 DIAGNOSIS — E78.2 MIXED HYPERLIPIDEMIA: Chronic | ICD-10-CM

## 2023-08-03 DIAGNOSIS — Z86.79 HISTORY OF HYPERTENSION: ICD-10-CM

## 2023-08-03 DIAGNOSIS — D69.6 THROMBOCYTOPENIA: ICD-10-CM

## 2023-08-03 LAB
ALBUMIN SERPL BCP-MCNC: 3.3 G/DL (ref 3.5–5.2)
ALBUMIN SERPL BCP-MCNC: 3.3 G/DL (ref 3.5–5.2)
ALP SERPL-CCNC: 76 U/L (ref 55–135)
ALP SERPL-CCNC: 76 U/L (ref 55–135)
ALT SERPL W/O P-5'-P-CCNC: 81 U/L (ref 10–44)
ALT SERPL W/O P-5'-P-CCNC: 81 U/L (ref 10–44)
ANION GAP SERPL CALC-SCNC: 7 MMOL/L (ref 8–16)
ANION GAP SERPL CALC-SCNC: 7 MMOL/L (ref 8–16)
AST SERPL-CCNC: 64 U/L (ref 10–40)
AST SERPL-CCNC: 64 U/L (ref 10–40)
BASOPHILS # BLD AUTO: 0.06 K/UL (ref 0–0.2)
BASOPHILS NFR BLD: 0.9 % (ref 0–1.9)
BILIRUB SERPL-MCNC: 0.5 MG/DL (ref 0.1–1)
BILIRUB SERPL-MCNC: 0.5 MG/DL (ref 0.1–1)
BUN SERPL-MCNC: 17 MG/DL (ref 8–23)
BUN SERPL-MCNC: 17 MG/DL (ref 8–23)
CALCIUM SERPL-MCNC: 8.8 MG/DL (ref 8.7–10.5)
CALCIUM SERPL-MCNC: 8.8 MG/DL (ref 8.7–10.5)
CHLORIDE SERPL-SCNC: 107 MMOL/L (ref 95–110)
CHLORIDE SERPL-SCNC: 107 MMOL/L (ref 95–110)
CHOLEST SERPL-MCNC: 198 MG/DL (ref 120–199)
CHOLEST/HDLC SERPL: 6.6 {RATIO} (ref 2–5)
CO2 SERPL-SCNC: 25 MMOL/L (ref 23–29)
CO2 SERPL-SCNC: 25 MMOL/L (ref 23–29)
CREAT SERPL-MCNC: 0.8 MG/DL (ref 0.5–1.4)
CREAT SERPL-MCNC: 0.8 MG/DL (ref 0.5–1.4)
DIFFERENTIAL METHOD: ABNORMAL
EOSINOPHIL # BLD AUTO: 0.2 K/UL (ref 0–0.5)
EOSINOPHIL NFR BLD: 2.7 % (ref 0–8)
ERYTHROCYTE [DISTWIDTH] IN BLOOD BY AUTOMATED COUNT: 12.6 % (ref 11.5–14.5)
EST. GFR  (NO RACE VARIABLE): >60 ML/MIN/1.73 M^2
EST. GFR  (NO RACE VARIABLE): >60 ML/MIN/1.73 M^2
GLUCOSE SERPL-MCNC: 119 MG/DL (ref 70–110)
GLUCOSE SERPL-MCNC: 119 MG/DL (ref 70–110)
HCT VFR BLD AUTO: 43.1 % (ref 40–54)
HDLC SERPL-MCNC: 30 MG/DL (ref 40–75)
HDLC SERPL: 15.2 % (ref 20–50)
HGB BLD-MCNC: 14.5 G/DL (ref 14–18)
IMM GRANULOCYTES # BLD AUTO: 0.06 K/UL (ref 0–0.04)
IMM GRANULOCYTES NFR BLD AUTO: 0.9 % (ref 0–0.5)
LDLC SERPL CALC-MCNC: 108.4 MG/DL (ref 63–159)
LYMPHOCYTES # BLD AUTO: 2.3 K/UL (ref 1–4.8)
LYMPHOCYTES NFR BLD: 33.5 % (ref 18–48)
MCH RBC QN AUTO: 31.3 PG (ref 27–31)
MCHC RBC AUTO-ENTMCNC: 33.6 G/DL (ref 32–36)
MCV RBC AUTO: 93 FL (ref 82–98)
MONOCYTES # BLD AUTO: 0.7 K/UL (ref 0.3–1)
MONOCYTES NFR BLD: 9.7 % (ref 4–15)
NEUTROPHILS # BLD AUTO: 3.7 K/UL (ref 1.8–7.7)
NEUTROPHILS NFR BLD: 52.3 % (ref 38–73)
NONHDLC SERPL-MCNC: 168 MG/DL
NRBC BLD-RTO: 0 /100 WBC
PLATELET # BLD AUTO: 168 K/UL (ref 150–450)
PMV BLD AUTO: 11 FL (ref 9.2–12.9)
POTASSIUM SERPL-SCNC: 4.2 MMOL/L (ref 3.5–5.1)
POTASSIUM SERPL-SCNC: 4.2 MMOL/L (ref 3.5–5.1)
PROT SERPL-MCNC: 6.7 G/DL (ref 6–8.4)
PROT SERPL-MCNC: 6.7 G/DL (ref 6–8.4)
RBC # BLD AUTO: 4.64 M/UL (ref 4.6–6.2)
SODIUM SERPL-SCNC: 139 MMOL/L (ref 136–145)
SODIUM SERPL-SCNC: 139 MMOL/L (ref 136–145)
TRIGL SERPL-MCNC: 298 MG/DL (ref 30–150)
WBC # BLD AUTO: 6.99 K/UL (ref 3.9–12.7)

## 2023-08-03 PROCEDURE — 85025 COMPLETE CBC W/AUTO DIFF WBC: CPT | Performed by: STUDENT IN AN ORGANIZED HEALTH CARE EDUCATION/TRAINING PROGRAM

## 2023-08-03 PROCEDURE — 36415 COLL VENOUS BLD VENIPUNCTURE: CPT | Mod: PO | Performed by: INTERNAL MEDICINE

## 2023-08-03 PROCEDURE — 80061 LIPID PANEL: CPT | Performed by: STUDENT IN AN ORGANIZED HEALTH CARE EDUCATION/TRAINING PROGRAM

## 2023-08-03 PROCEDURE — 80053 COMPREHEN METABOLIC PANEL: CPT | Performed by: INTERNAL MEDICINE

## 2024-01-01 ENCOUNTER — DOCUMENTATION ONLY (OUTPATIENT)
Dept: INFUSION THERAPY | Facility: HOSPITAL | Age: 69
End: 2024-01-01
Payer: MEDICARE

## 2024-01-01 ENCOUNTER — TELEPHONE (OUTPATIENT)
Dept: HEMATOLOGY/ONCOLOGY | Facility: CLINIC | Age: 69
End: 2024-01-01
Payer: MEDICARE

## 2024-01-01 ENCOUNTER — TELEPHONE (OUTPATIENT)
Dept: FAMILY MEDICINE | Facility: CLINIC | Age: 69
End: 2024-01-01
Payer: MEDICARE

## 2024-01-01 ENCOUNTER — PATIENT MESSAGE (OUTPATIENT)
Dept: FAMILY MEDICINE | Facility: CLINIC | Age: 69
End: 2024-01-01
Payer: MEDICARE

## 2024-01-01 ENCOUNTER — DOCUMENTATION ONLY (OUTPATIENT)
Dept: HEMATOLOGY/ONCOLOGY | Facility: CLINIC | Age: 69
End: 2024-01-01
Payer: MEDICARE

## 2024-01-01 ENCOUNTER — TELEPHONE (OUTPATIENT)
Dept: HEMATOLOGY/ONCOLOGY | Facility: CLINIC | Age: 69
End: 2024-01-01

## 2024-01-01 ENCOUNTER — OFFICE VISIT (OUTPATIENT)
Dept: FAMILY MEDICINE | Facility: CLINIC | Age: 69
End: 2024-01-01
Payer: MEDICARE

## 2024-01-01 ENCOUNTER — OFFICE VISIT (OUTPATIENT)
Dept: HEMATOLOGY/ONCOLOGY | Facility: CLINIC | Age: 69
End: 2024-01-01
Payer: MEDICARE

## 2024-01-01 ENCOUNTER — LAB VISIT (OUTPATIENT)
Dept: LAB | Facility: HOSPITAL | Age: 69
End: 2024-01-01
Attending: STUDENT IN AN ORGANIZED HEALTH CARE EDUCATION/TRAINING PROGRAM
Payer: MEDICARE

## 2024-01-01 ENCOUNTER — PATIENT MESSAGE (OUTPATIENT)
Dept: HEMATOLOGY/ONCOLOGY | Facility: CLINIC | Age: 69
End: 2024-01-01
Payer: MEDICARE

## 2024-01-01 ENCOUNTER — HOSPITAL ENCOUNTER (OUTPATIENT)
Dept: RADIOLOGY | Facility: HOSPITAL | Age: 69
Discharge: HOME | End: 2024-06-05
Attending: INTERNAL MEDICINE
Payer: MEDICARE

## 2024-01-01 ENCOUNTER — DOCUMENTATION ONLY (OUTPATIENT)
Dept: HEMATOLOGY/ONCOLOGY | Facility: CLINIC | Age: 69
End: 2024-01-01

## 2024-01-01 VITALS
SYSTOLIC BLOOD PRESSURE: 100 MMHG | HEIGHT: 70 IN | OXYGEN SATURATION: 90 % | WEIGHT: 282.19 LBS | BODY MASS INDEX: 40.4 KG/M2 | HEART RATE: 114 BPM | DIASTOLIC BLOOD PRESSURE: 68 MMHG | TEMPERATURE: 98 F | RESPIRATION RATE: 18 BRPM

## 2024-01-01 VITALS
SYSTOLIC BLOOD PRESSURE: 110 MMHG | BODY MASS INDEX: 38.77 KG/M2 | DIASTOLIC BLOOD PRESSURE: 80 MMHG | OXYGEN SATURATION: 96 % | WEIGHT: 270.81 LBS | HEART RATE: 102 BPM | HEIGHT: 70 IN

## 2024-01-01 VITALS
BODY MASS INDEX: 43.33 KG/M2 | SYSTOLIC BLOOD PRESSURE: 130 MMHG | HEIGHT: 70 IN | WEIGHT: 302.69 LBS | HEART RATE: 84 BPM | RESPIRATION RATE: 18 BRPM | DIASTOLIC BLOOD PRESSURE: 88 MMHG

## 2024-01-01 VITALS
HEIGHT: 70 IN | BODY MASS INDEX: 42.38 KG/M2 | DIASTOLIC BLOOD PRESSURE: 66 MMHG | RESPIRATION RATE: 20 BRPM | TEMPERATURE: 97 F | HEART RATE: 93 BPM | OXYGEN SATURATION: 99 % | SYSTOLIC BLOOD PRESSURE: 116 MMHG | WEIGHT: 296.06 LBS

## 2024-01-01 VITALS
BODY MASS INDEX: 42.75 KG/M2 | HEART RATE: 81 BPM | DIASTOLIC BLOOD PRESSURE: 72 MMHG | OXYGEN SATURATION: 95 % | WEIGHT: 298.63 LBS | SYSTOLIC BLOOD PRESSURE: 114 MMHG | HEIGHT: 70 IN

## 2024-01-01 DIAGNOSIS — R09.02 HYPOXEMIA: ICD-10-CM

## 2024-01-01 DIAGNOSIS — C78.01 MALIGNANT NEOPLASM METASTATIC TO BOTH LUNGS: Primary | ICD-10-CM

## 2024-01-01 DIAGNOSIS — R06.00 DYSPNEA, UNSPECIFIED TYPE: ICD-10-CM

## 2024-01-01 DIAGNOSIS — C80.1 METASTASIS TO LYMPH NODE OF UNKNOWN PRIMARY: ICD-10-CM

## 2024-01-01 DIAGNOSIS — Z09 HOSPITAL DISCHARGE FOLLOW-UP: Primary | ICD-10-CM

## 2024-01-01 DIAGNOSIS — R10.11 RUQ ABDOMINAL PAIN: ICD-10-CM

## 2024-01-01 DIAGNOSIS — R06.00 DYSPNEA, UNSPECIFIED TYPE: Primary | ICD-10-CM

## 2024-01-01 DIAGNOSIS — E80.6 HYPERBILIRUBINEMIA: ICD-10-CM

## 2024-01-01 DIAGNOSIS — Z86.2 HISTORY OF THROMBOCYTOPENIA: ICD-10-CM

## 2024-01-01 DIAGNOSIS — E78.2 MIXED HYPERLIPIDEMIA: Chronic | ICD-10-CM

## 2024-01-01 DIAGNOSIS — C77.9 METASTASIS TO LYMPH NODE OF UNKNOWN PRIMARY: ICD-10-CM

## 2024-01-01 DIAGNOSIS — R63.0 ANOREXIA: ICD-10-CM

## 2024-01-01 DIAGNOSIS — Z00.00 PREVENTATIVE HEALTH CARE: Primary | ICD-10-CM

## 2024-01-01 DIAGNOSIS — Z00.00 PREVENTATIVE HEALTH CARE: ICD-10-CM

## 2024-01-01 DIAGNOSIS — J18.9 PNEUMONIA DUE TO INFECTIOUS ORGANISM, UNSPECIFIED LATERALITY, UNSPECIFIED PART OF LUNG: ICD-10-CM

## 2024-01-01 DIAGNOSIS — K21.9 GASTROESOPHAGEAL REFLUX DISEASE, UNSPECIFIED WHETHER ESOPHAGITIS PRESENT: ICD-10-CM

## 2024-01-01 DIAGNOSIS — C78.00 SECONDARY CARCINOMA OF LUNG, UNSPECIFIED LATERALITY: ICD-10-CM

## 2024-01-01 DIAGNOSIS — R74.01 TRANSAMINITIS: ICD-10-CM

## 2024-01-01 DIAGNOSIS — R79.9 ABNORMAL FINDING OF BLOOD CHEMISTRY, UNSPECIFIED: ICD-10-CM

## 2024-01-01 DIAGNOSIS — C22.0 LIVER CARCINOMA: Primary | ICD-10-CM

## 2024-01-01 DIAGNOSIS — Z91.89 RISK FOR CORONARY ARTERY DISEASE BETWEEN 10% AND 20% IN NEXT 10 YEARS PER FRAMINGHAM SCORE: Primary | ICD-10-CM

## 2024-01-01 DIAGNOSIS — R82.998 DARK URINE: ICD-10-CM

## 2024-01-01 DIAGNOSIS — Z13.1 ENCOUNTER FOR SCREENING FOR DIABETES MELLITUS: ICD-10-CM

## 2024-01-01 DIAGNOSIS — R91.8 PULMONARY NODULES: ICD-10-CM

## 2024-01-01 DIAGNOSIS — J20.9 BRONCHITIS WITH BRONCHOSPASM: Primary | ICD-10-CM

## 2024-01-01 DIAGNOSIS — Z86.79 HISTORY OF HYPERTENSION: Chronic | ICD-10-CM

## 2024-01-01 DIAGNOSIS — R06.2 WHEEZING: ICD-10-CM

## 2024-01-01 DIAGNOSIS — F41.9 ANXIETY: ICD-10-CM

## 2024-01-01 DIAGNOSIS — C79.51 METASTASIS TO BONE: ICD-10-CM

## 2024-01-01 DIAGNOSIS — K76.89 LIVER NODULE: Primary | ICD-10-CM

## 2024-01-01 DIAGNOSIS — R53.81 DEBILITY: ICD-10-CM

## 2024-01-01 DIAGNOSIS — R93.0 ABNORMAL X-RAY OF FACIAL BONES: ICD-10-CM

## 2024-01-01 DIAGNOSIS — K76.89 LIVER NODULE: ICD-10-CM

## 2024-01-01 DIAGNOSIS — C78.02 MALIGNANT NEOPLASM METASTATIC TO BOTH LUNGS: Primary | ICD-10-CM

## 2024-01-01 DIAGNOSIS — D64.9 NORMOCYTIC ANEMIA: ICD-10-CM

## 2024-01-01 DIAGNOSIS — R05.1 ACUTE COUGH: ICD-10-CM

## 2024-01-01 DIAGNOSIS — R06.02 SHORTNESS OF BREATH: ICD-10-CM

## 2024-01-01 DIAGNOSIS — R05.9 COUGH, UNSPECIFIED TYPE: ICD-10-CM

## 2024-01-01 LAB
ALBUMIN SERPL BCP-MCNC: 3.4 G/DL (ref 3.5–5.2)
ALP SERPL-CCNC: 57 U/L (ref 55–135)
ALT SERPL W/O P-5'-P-CCNC: 20 U/L (ref 10–44)
ANION GAP SERPL CALC-SCNC: 8 MMOL/L (ref 8–16)
AST SERPL-CCNC: 53 U/L (ref 10–40)
BASOPHILS # BLD AUTO: 0.07 K/UL (ref 0–0.2)
BASOPHILS NFR BLD: 0.6 % (ref 0–1.9)
BILIRUB SERPL-MCNC: 1 MG/DL (ref 0.1–1)
BILIRUBIN, UA POC OHS: NEGATIVE
BLOOD, UA POC OHS: NEGATIVE
BUN SERPL-MCNC: 14 MG/DL (ref 8–23)
CALCIUM SERPL-MCNC: 9.1 MG/DL (ref 8.7–10.5)
CHLORIDE SERPL-SCNC: 101 MMOL/L (ref 95–110)
CHOLEST SERPL-MCNC: 150 MG/DL (ref 120–199)
CHOLEST/HDLC SERPL: 4.7 {RATIO} (ref 2–5)
CLARITY, UA POC OHS: CLEAR
CO2 SERPL-SCNC: 26 MMOL/L (ref 23–29)
COLOR, UA POC OHS: ABNORMAL
CREAT SERPL-MCNC: 0.7 MG/DL (ref 0.5–1.4)
CTP QC/QA: YES
DIFFERENTIAL METHOD BLD: ABNORMAL
EOSINOPHIL # BLD AUTO: 0.1 K/UL (ref 0–0.5)
EOSINOPHIL NFR BLD: 1 % (ref 0–8)
ERYTHROCYTE [DISTWIDTH] IN BLOOD BY AUTOMATED COUNT: 13.5 % (ref 11.5–14.5)
EST. GFR  (NO RACE VARIABLE): >60 ML/MIN/1.73 M^2
ESTIMATED AVG GLUCOSE: 111 MG/DL (ref 68–131)
GLUCOSE SERPL-MCNC: 103 MG/DL (ref 70–110)
GLUCOSE SERPL-MCNC: 117 MG/DL (ref 70–110)
GLUCOSE, UA POC OHS: NEGATIVE
HBA1C MFR BLD: 5.5 % (ref 4–5.6)
HCT VFR BLD AUTO: 45.4 % (ref 40–54)
HDLC SERPL-MCNC: 32 MG/DL (ref 40–75)
HDLC SERPL: 21.3 % (ref 20–50)
HGB BLD-MCNC: 14.7 G/DL (ref 14–18)
IMM GRANULOCYTES # BLD AUTO: 0.22 K/UL (ref 0–0.04)
IMM GRANULOCYTES NFR BLD AUTO: 1.8 % (ref 0–0.5)
KETONES, UA POC OHS: NEGATIVE
LDLC SERPL CALC-MCNC: 99.8 MG/DL (ref 63–159)
LEUKOCYTES, UA POC OHS: NEGATIVE
LYMPHOCYTES # BLD AUTO: 1.9 K/UL (ref 1–4.8)
LYMPHOCYTES NFR BLD: 15.7 % (ref 18–48)
MCH RBC QN AUTO: 29.3 PG (ref 27–31)
MCHC RBC AUTO-ENTMCNC: 32.4 G/DL (ref 32–36)
MCV RBC AUTO: 91 FL (ref 82–98)
MONOCYTES # BLD AUTO: 1.4 K/UL (ref 0.3–1)
MONOCYTES NFR BLD: 11.5 % (ref 4–15)
NEUTROPHILS # BLD AUTO: 8.4 K/UL (ref 1.8–7.7)
NEUTROPHILS NFR BLD: 69.4 % (ref 38–73)
NITRITE, UA POC OHS: NEGATIVE
NONHDLC SERPL-MCNC: 118 MG/DL
NRBC BLD-RTO: 0 /100 WBC
PH, UA POC OHS: 5.5
PLATELET # BLD AUTO: 214 K/UL (ref 150–450)
PMV BLD AUTO: 10.6 FL (ref 9.2–12.9)
POTASSIUM SERPL-SCNC: 4.6 MMOL/L (ref 3.5–5.1)
PROT SERPL-MCNC: 6.8 G/DL (ref 6–8.4)
PROTEIN, UA POC OHS: 30
RBC # BLD AUTO: 5.01 M/UL (ref 4.6–6.2)
SARS-COV-2 RDRP RESP QL NAA+PROBE: NEGATIVE
SODIUM SERPL-SCNC: 135 MMOL/L (ref 136–145)
SPECIFIC GRAVITY, UA POC OHS: >=1.03
TRIGL SERPL-MCNC: 91 MG/DL (ref 30–150)
UROBILINOGEN, UA POC OHS: 4
WBC # BLD AUTO: 12.16 K/UL (ref 3.9–12.7)

## 2024-01-01 PROCEDURE — 3078F DIAST BP <80 MM HG: CPT | Mod: CPTII,S$GLB,, | Performed by: STUDENT IN AN ORGANIZED HEALTH CARE EDUCATION/TRAINING PROGRAM

## 2024-01-01 PROCEDURE — 3008F BODY MASS INDEX DOCD: CPT | Mod: CPTII,S$GLB,, | Performed by: STUDENT IN AN ORGANIZED HEALTH CARE EDUCATION/TRAINING PROGRAM

## 2024-01-01 PROCEDURE — 99999 PR PBB SHADOW E&M-EST. PATIENT-LVL IV: CPT | Mod: PBBFAC,,, | Performed by: STUDENT IN AN ORGANIZED HEALTH CARE EDUCATION/TRAINING PROGRAM

## 2024-01-01 PROCEDURE — 1126F AMNT PAIN NOTED NONE PRSNT: CPT | Mod: CPTII,S$GLB,, | Performed by: STUDENT IN AN ORGANIZED HEALTH CARE EDUCATION/TRAINING PROGRAM

## 2024-01-01 PROCEDURE — 3288F FALL RISK ASSESSMENT DOCD: CPT | Mod: CPTII,S$GLB,, | Performed by: NURSE PRACTITIONER

## 2024-01-01 PROCEDURE — 80061 LIPID PANEL: CPT | Performed by: STUDENT IN AN ORGANIZED HEALTH CARE EDUCATION/TRAINING PROGRAM

## 2024-01-01 PROCEDURE — 1159F MED LIST DOCD IN RCRD: CPT | Mod: CPTII,S$GLB,, | Performed by: STUDENT IN AN ORGANIZED HEALTH CARE EDUCATION/TRAINING PROGRAM

## 2024-01-01 PROCEDURE — 1101F PT FALLS ASSESS-DOCD LE1/YR: CPT | Mod: CPTII,S$GLB,, | Performed by: INTERNAL MEDICINE

## 2024-01-01 PROCEDURE — 36415 COLL VENOUS BLD VENIPUNCTURE: CPT | Mod: PN | Performed by: STUDENT IN AN ORGANIZED HEALTH CARE EDUCATION/TRAINING PROGRAM

## 2024-01-01 PROCEDURE — 4010F ACE/ARB THERAPY RXD/TAKEN: CPT | Mod: CPTII,S$GLB,, | Performed by: NURSE PRACTITIONER

## 2024-01-01 PROCEDURE — 99213 OFFICE O/P EST LOW 20 MIN: CPT | Mod: 25,S$GLB,, | Performed by: NURSE PRACTITIONER

## 2024-01-01 PROCEDURE — 3288F FALL RISK ASSESSMENT DOCD: CPT | Mod: CPTII,S$GLB,, | Performed by: INTERNAL MEDICINE

## 2024-01-01 PROCEDURE — 3078F DIAST BP <80 MM HG: CPT | Mod: CPTII,S$GLB,, | Performed by: INTERNAL MEDICINE

## 2024-01-01 PROCEDURE — 3288F FALL RISK ASSESSMENT DOCD: CPT | Mod: CPTII,S$GLB,, | Performed by: STUDENT IN AN ORGANIZED HEALTH CARE EDUCATION/TRAINING PROGRAM

## 2024-01-01 PROCEDURE — 99999 PR PBB SHADOW E&M-EST. PATIENT-LVL V: CPT | Mod: PBBFAC,,, | Performed by: INTERNAL MEDICINE

## 2024-01-01 PROCEDURE — 1159F MED LIST DOCD IN RCRD: CPT | Mod: CPTII,S$GLB,, | Performed by: NURSE PRACTITIONER

## 2024-01-01 PROCEDURE — 3074F SYST BP LT 130 MM HG: CPT | Mod: CPTII,S$GLB,, | Performed by: NURSE PRACTITIONER

## 2024-01-01 PROCEDURE — 3079F DIAST BP 80-89 MM HG: CPT | Mod: CPTII,S$GLB,, | Performed by: NURSE PRACTITIONER

## 2024-01-01 PROCEDURE — 78815 PET IMAGE W/CT SKULL-THIGH: CPT | Mod: TC,PI,PN

## 2024-01-01 PROCEDURE — 1101F PT FALLS ASSESS-DOCD LE1/YR: CPT | Mod: CPTII,S$GLB,, | Performed by: NURSE PRACTITIONER

## 2024-01-01 PROCEDURE — 1126F AMNT PAIN NOTED NONE PRSNT: CPT | Mod: CPTII,S$GLB,, | Performed by: INTERNAL MEDICINE

## 2024-01-01 PROCEDURE — 3044F HG A1C LEVEL LT 7.0%: CPT | Mod: CPTII,S$GLB,, | Performed by: INTERNAL MEDICINE

## 2024-01-01 PROCEDURE — 85025 COMPLETE CBC W/AUTO DIFF WBC: CPT | Performed by: STUDENT IN AN ORGANIZED HEALTH CARE EDUCATION/TRAINING PROGRAM

## 2024-01-01 PROCEDURE — 3008F BODY MASS INDEX DOCD: CPT | Mod: CPTII,S$GLB,, | Performed by: NURSE PRACTITIONER

## 2024-01-01 PROCEDURE — 1125F AMNT PAIN NOTED PAIN PRSNT: CPT | Mod: CPTII,S$GLB,, | Performed by: NURSE PRACTITIONER

## 2024-01-01 PROCEDURE — 1111F DSCHRG MED/CURRENT MED MERGE: CPT | Mod: CPTII,S$GLB,, | Performed by: NURSE PRACTITIONER

## 2024-01-01 PROCEDURE — 78815 PET IMAGE W/CT SKULL-THIGH: CPT | Mod: 26,PI,, | Performed by: STUDENT IN AN ORGANIZED HEALTH CARE EDUCATION/TRAINING PROGRAM

## 2024-01-01 PROCEDURE — 99214 OFFICE O/P EST MOD 30 MIN: CPT | Mod: 25,S$GLB,, | Performed by: STUDENT IN AN ORGANIZED HEALTH CARE EDUCATION/TRAINING PROGRAM

## 2024-01-01 PROCEDURE — 1160F RVW MEDS BY RX/DR IN RCRD: CPT | Mod: CPTII,S$GLB,, | Performed by: STUDENT IN AN ORGANIZED HEALTH CARE EDUCATION/TRAINING PROGRAM

## 2024-01-01 PROCEDURE — 99215 OFFICE O/P EST HI 40 MIN: CPT | Mod: S$GLB,,, | Performed by: INTERNAL MEDICINE

## 2024-01-01 PROCEDURE — G2211 COMPLEX E/M VISIT ADD ON: HCPCS | Mod: S$GLB,,, | Performed by: INTERNAL MEDICINE

## 2024-01-01 PROCEDURE — 3074F SYST BP LT 130 MM HG: CPT | Mod: CPTII,S$GLB,, | Performed by: STUDENT IN AN ORGANIZED HEALTH CARE EDUCATION/TRAINING PROGRAM

## 2024-01-01 PROCEDURE — 4010F ACE/ARB THERAPY RXD/TAKEN: CPT | Mod: CPTII,S$GLB,, | Performed by: STUDENT IN AN ORGANIZED HEALTH CARE EDUCATION/TRAINING PROGRAM

## 2024-01-01 PROCEDURE — 1101F PT FALLS ASSESS-DOCD LE1/YR: CPT | Mod: CPTII,S$GLB,, | Performed by: STUDENT IN AN ORGANIZED HEALTH CARE EDUCATION/TRAINING PROGRAM

## 2024-01-01 PROCEDURE — 99214 OFFICE O/P EST MOD 30 MIN: CPT | Mod: S$GLB,,, | Performed by: STUDENT IN AN ORGANIZED HEALTH CARE EDUCATION/TRAINING PROGRAM

## 2024-01-01 PROCEDURE — 1125F AMNT PAIN NOTED PAIN PRSNT: CPT | Mod: CPTII,S$GLB,, | Performed by: STUDENT IN AN ORGANIZED HEALTH CARE EDUCATION/TRAINING PROGRAM

## 2024-01-01 PROCEDURE — A9552 F18 FDG: HCPCS | Mod: PN | Performed by: INTERNAL MEDICINE

## 2024-01-01 PROCEDURE — 3079F DIAST BP 80-89 MM HG: CPT | Mod: CPTII,S$GLB,, | Performed by: STUDENT IN AN ORGANIZED HEALTH CARE EDUCATION/TRAINING PROGRAM

## 2024-01-01 PROCEDURE — 80053 COMPREHEN METABOLIC PANEL: CPT | Performed by: STUDENT IN AN ORGANIZED HEALTH CARE EDUCATION/TRAINING PROGRAM

## 2024-01-01 PROCEDURE — 3044F HG A1C LEVEL LT 7.0%: CPT | Mod: CPTII,S$GLB,, | Performed by: NURSE PRACTITIONER

## 2024-01-01 PROCEDURE — 99397 PER PM REEVAL EST PAT 65+ YR: CPT | Mod: S$GLB,,, | Performed by: STUDENT IN AN ORGANIZED HEALTH CARE EDUCATION/TRAINING PROGRAM

## 2024-01-01 PROCEDURE — 83036 HEMOGLOBIN GLYCOSYLATED A1C: CPT | Performed by: STUDENT IN AN ORGANIZED HEALTH CARE EDUCATION/TRAINING PROGRAM

## 2024-01-01 PROCEDURE — 1126F AMNT PAIN NOTED NONE PRSNT: CPT | Mod: CPTII,S$GLB,, | Performed by: NURSE PRACTITIONER

## 2024-01-01 PROCEDURE — 3075F SYST BP GE 130 - 139MM HG: CPT | Mod: CPTII,S$GLB,, | Performed by: STUDENT IN AN ORGANIZED HEALTH CARE EDUCATION/TRAINING PROGRAM

## 2024-01-01 PROCEDURE — 3078F DIAST BP <80 MM HG: CPT | Mod: CPTII,S$GLB,, | Performed by: NURSE PRACTITIONER

## 2024-01-01 PROCEDURE — 81003 URINALYSIS AUTO W/O SCOPE: CPT | Mod: QW,S$GLB,, | Performed by: NURSE PRACTITIONER

## 2024-01-01 PROCEDURE — 3008F BODY MASS INDEX DOCD: CPT | Mod: CPTII,S$GLB,, | Performed by: INTERNAL MEDICINE

## 2024-01-01 PROCEDURE — 99214 OFFICE O/P EST MOD 30 MIN: CPT | Mod: S$GLB,,, | Performed by: NURSE PRACTITIONER

## 2024-01-01 PROCEDURE — 1111F DSCHRG MED/CURRENT MED MERGE: CPT | Mod: CPTII,S$GLB,, | Performed by: INTERNAL MEDICINE

## 2024-01-01 PROCEDURE — 99999 PR PBB SHADOW E&M-EST. PATIENT-LVL IV: CPT | Mod: PBBFAC,,, | Performed by: NURSE PRACTITIONER

## 2024-01-01 PROCEDURE — 4010F ACE/ARB THERAPY RXD/TAKEN: CPT | Mod: CPTII,S$GLB,, | Performed by: INTERNAL MEDICINE

## 2024-01-01 PROCEDURE — 3044F HG A1C LEVEL LT 7.0%: CPT | Mod: CPTII,S$GLB,, | Performed by: STUDENT IN AN ORGANIZED HEALTH CARE EDUCATION/TRAINING PROGRAM

## 2024-01-01 PROCEDURE — 87635 SARS-COV-2 COVID-19 AMP PRB: CPT | Mod: QW,S$GLB,, | Performed by: NURSE PRACTITIONER

## 2024-01-01 PROCEDURE — 96372 THER/PROPH/DIAG INJ SC/IM: CPT | Mod: S$GLB,,, | Performed by: NURSE PRACTITIONER

## 2024-01-01 PROCEDURE — 3074F SYST BP LT 130 MM HG: CPT | Mod: CPTII,S$GLB,, | Performed by: INTERNAL MEDICINE

## 2024-01-01 RX ORDER — AMOXICILLIN 500 MG
CAPSULE ORAL DAILY
COMMUNITY
End: 2024-01-01 | Stop reason: CLARIF

## 2024-01-01 RX ORDER — CYPROHEPTADINE HYDROCHLORIDE 4 MG/1
4 TABLET ORAL 4 TIMES DAILY
Qty: 120 TABLET | Refills: 4 | Status: SHIPPED | OUTPATIENT
Start: 2024-01-01

## 2024-01-01 RX ORDER — AMOXICILLIN AND CLAVULANATE POTASSIUM 875; 125 MG/1; MG/1
1 TABLET, FILM COATED ORAL 2 TIMES DAILY
Qty: 20 TABLET | Refills: 0 | Status: ON HOLD | OUTPATIENT
Start: 2024-01-01 | End: 2024-01-01 | Stop reason: HOSPADM

## 2024-01-01 RX ORDER — ALBUTEROL SULFATE 90 UG/1
1-2 AEROSOL, METERED RESPIRATORY (INHALATION) EVERY 6 HOURS PRN
Qty: 18 G | Refills: 0 | Status: ON HOLD | OUTPATIENT
Start: 2024-01-01 | End: 2024-01-01

## 2024-01-01 RX ORDER — ALBUTEROL SULFATE 5 MG/ML
2.5 SOLUTION RESPIRATORY (INHALATION) EVERY 6 HOURS PRN
Qty: 20 EACH | Refills: 6 | Status: SHIPPED | OUTPATIENT
Start: 2024-01-01

## 2024-01-01 RX ORDER — BENZONATATE 200 MG/1
200 CAPSULE ORAL 3 TIMES DAILY PRN
COMMUNITY
End: 2024-01-01 | Stop reason: SDUPTHER

## 2024-01-01 RX ORDER — BENZONATATE 200 MG/1
200 CAPSULE ORAL 3 TIMES DAILY PRN
Qty: 30 CAPSULE | Refills: 0 | Status: SHIPPED | OUTPATIENT
Start: 2024-01-01 | End: 2024-01-01

## 2024-01-01 RX ORDER — DEXAMETHASONE SODIUM PHOSPHATE 4 MG/ML
8 INJECTION, SOLUTION INTRA-ARTICULAR; INTRALESIONAL; INTRAMUSCULAR; INTRAVENOUS; SOFT TISSUE
Status: COMPLETED | OUTPATIENT
Start: 2024-01-01 | End: 2024-01-01

## 2024-01-01 RX ORDER — ALBUTEROL SULFATE 90 UG/1
1-2 AEROSOL, METERED RESPIRATORY (INHALATION) EVERY 6 HOURS PRN
Qty: 6.7 G | Refills: 0 | Status: SHIPPED | OUTPATIENT
Start: 2024-01-01

## 2024-01-01 RX ORDER — ALPRAZOLAM 0.25 MG/1
0.25 TABLET ORAL 2 TIMES DAILY PRN
Qty: 90 TABLET | Refills: 0 | Status: SHIPPED | OUTPATIENT
Start: 2024-01-01 | End: 2024-07-07

## 2024-01-01 RX ORDER — FLUDEOXYGLUCOSE F18 500 MCI/ML
12.6 INJECTION INTRAVENOUS
Status: COMPLETED | OUTPATIENT
Start: 2024-01-01 | End: 2024-01-01

## 2024-01-01 RX ORDER — TELMISARTAN 20 MG/1
20 TABLET ORAL DAILY
Qty: 30 TABLET | Refills: 11 | Status: ON HOLD | OUTPATIENT
Start: 2024-01-01 | End: 2024-01-01 | Stop reason: HOSPADM

## 2024-01-01 RX ORDER — BENZONATATE 100 MG/1
200 CAPSULE ORAL 3 TIMES DAILY PRN
Qty: 90 CAPSULE | Refills: 6 | Status: SHIPPED | OUTPATIENT
Start: 2024-01-01

## 2024-01-01 RX ORDER — BENZONATATE 200 MG/1
200 CAPSULE ORAL 3 TIMES DAILY PRN
Qty: 30 CAPSULE | Refills: 0 | Status: SHIPPED | OUTPATIENT
Start: 2024-01-01 | End: 2024-01-01 | Stop reason: DRUGHIGH

## 2024-01-01 RX ORDER — SIMVASTATIN 10 MG/1
10 TABLET, FILM COATED ORAL NIGHTLY
Qty: 90 TABLET | Refills: 3 | Status: ON HOLD | OUTPATIENT
Start: 2024-01-01 | End: 2024-01-01 | Stop reason: HOSPADM

## 2024-01-01 RX ORDER — ALBUTEROL SULFATE 5 MG/ML
2.5 SOLUTION RESPIRATORY (INHALATION) EVERY 6 HOURS PRN
COMMUNITY
End: 2024-01-01 | Stop reason: SDUPTHER

## 2024-01-01 RX ORDER — ALBUTEROL SULFATE 90 UG/1
1-2 AEROSOL, METERED RESPIRATORY (INHALATION) EVERY 6 HOURS PRN
OUTPATIENT
Start: 2024-01-01

## 2024-01-01 RX ORDER — PANTOPRAZOLE SODIUM 40 MG/1
40 TABLET, DELAYED RELEASE ORAL DAILY
Qty: 30 TABLET | Refills: 1 | Status: SHIPPED | OUTPATIENT
Start: 2024-01-01 | End: 2025-06-07

## 2024-01-01 RX ORDER — TELMISARTAN 40 MG/1
1 TABLET ORAL DAILY
COMMUNITY
End: 2024-01-01

## 2024-01-01 RX ADMIN — DEXAMETHASONE SODIUM PHOSPHATE 8 MG: 4 INJECTION, SOLUTION INTRA-ARTICULAR; INTRALESIONAL; INTRAMUSCULAR; INTRAVENOUS; SOFT TISSUE at 12:05

## 2024-01-01 RX ADMIN — FLUDEOXYGLUCOSE F-18 12.6 MILLICURIE: 500 INJECTION INTRAVENOUS at 10:06

## 2024-05-03 PROBLEM — Z86.2 HISTORY OF THROMBOCYTOPENIA: Status: ACTIVE | Noted: 2024-01-01

## 2024-05-03 PROBLEM — D69.6 THROMBOCYTOPENIA: Chronic | Status: RESOLVED | Noted: 2023-01-01 | Resolved: 2024-01-01

## 2024-05-03 PROBLEM — R74.01 TRANSAMINITIS: Status: ACTIVE | Noted: 2024-01-01

## 2024-05-03 NOTE — PROGRESS NOTES
Plan:      Raul was seen today for hypertension.    Diagnoses and all orders for this visit:    Preventative health care: Discussed age appropriate preventative healthcare items such as cancer screenings and recommended immunizations. Discussed whether patient is using tobacco, alcohol, or illicit drugs and any concerns were discussed. Discussed maintenance of a healthy weight. Patient queried if he has any additional questions about preventative healthcare and all questions were answered.  -     Hemoglobin A1C; Future  -     Lipid Panel; Future  -     Comprehensive Metabolic Panel; Future  -     CBC Auto Differential; Future    Mixed hyperlipidemia  -     Lipid Panel; Future  -     Comprehensive Metabolic Panel; Future    Transaminitis  -     Comprehensive Metabolic Panel; Future    Encounter for screening for diabetes mellitus  -     Hemoglobin A1C; Future  -     Comprehensive Metabolic Panel; Future    History of thrombocytopenia  -     CBC Auto Differential; Future    Abnormal finding of blood chemistry, unspecified  -     Hemoglobin A1C; Future    History of hypertension: Pt to keep home BP log to review at next appointment. Okay to take 1/2 tablet of telmisartan (10 mg) depending on home BP results/trend.  -     telmisartan (MICARDIS) 20 MG Tab; Take 1 tablet (20 mg total) by mouth once daily.  -     Ambulatory referral/consult to Optometry; Future    Abnormal x-ray of facial bones  -     Ambulatory referral/consult to ENT; Future      Follow up in about 2 weeks (around 5/17/2024), or if symptoms worsen or fail to improve.    Chiquita Marte MD  05/03/2024    Subjective:      Patient ID: Raul Acevedo Rena is a 68 y.o. male    Chief Complaint   Patient presents with    Hypertension     Patient states he went to the dentist on yesterday and his bp 177/107. And he's been having a lot of headaches, and he hasn't been on his medication for awhile.      HPI  68 y.o. male with a PMHx as documented below  presents to clinic today for the following:    Elevated BP at dentist visit the other day - here for follow-up. Pt reports mild elevation in BP at home, highest 140/90s. Associated symptoms include intermittent, diffuse headaches described as 'pressure headaches.' No acute vision changes, focal neuro symptoms, chest pain, SOB, dizziness, lightheadedness.     Requests referral for routine eye exam.     Reports abnormal xray at dentist office showing potential partial obstruction of nasal cavity? Pt was recommended to follow-up with ENT.     Hx of HTN:   - Previous Rx: Telmisartan 40 mg daily   - Recently not requiring pharmacologic intervention     HLD:   - Previous Rx: Lipitor 10 mg daily - no longer taking  - Due for repeat labs    ROS  Constitutional:  Negative for chills and fever.   Respiratory:  Negative for shortness of breath.    Cardiovascular:  Negative for chest pain.   Gastrointestinal:  Negative for abdominal pain, constipation, diarrhea, nausea and vomiting.     Current Outpatient Medications   Medication Instructions    albuterol (PROVENTIL/VENTOLIN HFA) 90 mcg/actuation inhaler 1-2 puffs, Inhalation, Every 6 hours PRN, Rescue    ascorbic acid (vitamin C) (VITAMIN C) 1,000 mg, Oral, Daily    chlorhexidine (PERIDEX) 0.12 % solution chlorhexidine gluconate 0.12 % mouthwash   RINSE AND SPIT WITH 15ML FOR 30 SEC EVERY 12 HOURS    CHONDROITIN SULFATE A SODIUM ORAL Oral    glucosamine/chondroitin/C/Kyrie (GLUCOSAMINE 1500 COMPLEX ORAL) Oral    ibuprofen (ADVIL,MOTRIN) 800 MG tablet ibuprofen 800 mg tablet   TAKE 1 TABLET 3 TIMES A DAY    multivit with minerals/lutein (MULTIVITAMIN 50 PLUS ORAL) multivitamin   1 tab po q day    omega-3 fatty acids/fish oil (FISH OIL-OMEGA-3 FATTY ACIDS) 300-1,000 mg capsule Oral, Daily    telmisartan (MICARDIS) 20 mg, Oral, Daily    ubidecarenone (ULTRA COQ10 ORAL) Oral    vitamin E (AQUASOL E, D-ALPHA TOCOPHEROL, ORAL) vitamin E   2 po q day      Past Medical History:  "  Diagnosis Date    Essential hypertension 09/07/2021    GERD (gastroesophageal reflux disease)     Mixed hyperlipidemia     EDUARDO on CPAP     Thrombocytopenia 7/27/2023      Objective:      Vitals:    05/03/24 0955 05/03/24 1009   BP: (!) 142/90 130/88   BP Location: Left arm    Patient Position: Sitting    Pulse: 84    Resp: 18    Weight: (!) 137.3 kg (302 lb 11.1 oz)    Height: 5' 10" (1.778 m)      Body mass index is 43.43 kg/m².    Physical Exam   Constitutional:       General: No acute distress.  HENT:      Head: Normocephalic and atraumatic.   Pulmonary:      Effort: Pulmonary effort is normal. No respiratory distress.   Neurological:      General: No focal deficit present.      Mental Status: Alert and oriented to person, place, and time. Mental status is at baseline.    Assessment:       1. Preventative health care    2. Mixed hyperlipidemia    3. Transaminitis    4. Encounter for screening for diabetes mellitus    5. History of thrombocytopenia    6. Abnormal finding of blood chemistry, unspecified    7. History of hypertension    8. Abnormal x-ray of facial bones        Chiquita Marte MD  [facility]  Office: [phone]   Fax: [phone]  05/03/2024      Disclaimer: This note was partly generated using dictation software which may occasionally result in transcription errors.    Total time spent on this encounter includes face to face time and non-face to face time preparing to see the patient (eg, review of tests), obtaining and/or reviewing separately obtained history, documenting clinical information in the electronic or other health record, independently interpreting results, and communicating results to the patient/family/caregiver, or care coordinator.    "

## 2024-05-16 PROBLEM — R74.01 TRANSAMINITIS: Status: RESOLVED | Noted: 2024-01-01 | Resolved: 2024-01-01

## 2024-05-16 PROBLEM — Z86.2 HISTORY OF THROMBOCYTOPENIA: Chronic | Status: ACTIVE | Noted: 2024-01-01

## 2024-05-16 NOTE — PROGRESS NOTES
Plan:      Raul was seen today for follow-up.    Diagnoses and all orders for this visit:    Risk for coronary artery disease between 10% and 20% in next 10 years per Garner score  -     simvastatin (ZOCOR) 10 MG tablet; Take 1 tablet (10 mg total) by mouth every evening.  -     Comprehensive Metabolic Panel; Future    Mixed hyperlipidemia  -     simvastatin (ZOCOR) 10 MG tablet; Take 1 tablet (10 mg total) by mouth every evening.  -     Comprehensive Metabolic Panel; Future    Repeat CMP in 6 weeks to monitor LFTs.     Follow up in about 6 months (around 11/16/2024), or if symptoms worsen or fail to improve.    Chiquita Marte MD  05/16/2024    Subjective:      Patient ID: Raul Acevedo Sr. is a 68 y.o. male    Chief Complaint   Patient presents with    Follow-up     HPI  68 y.o. male with a PMHx as documented below presents to clinic today for the following:     HTN:   - Restarted telmisartan 20 mg daily at last clinic visit with significant improvement and good control of BP (average 120s/70s)   - Previous Rx: Telmisartan 40 mg daily      HLD:   - Previous Rx: Lipitor 10 mg daily (reports mild muscle cramping)  - Most recent lipid panel w/ low HDL, otherwise wnl    The 10-year ASCVD risk score (Monserrat DK, et al., 2019) is: 12.9%    Values used to calculate the score:      Age: 68 years      Sex: Male      Is Non- : No      Diabetic: No      Tobacco smoker: No      Systolic Blood Pressure: 114 mmHg      Is BP treated: No      HDL Cholesterol: 32 mg/dL      Total Cholesterol: 150 mg/dL    ROS  Constitutional:  Negative for chills and fever.   Respiratory:  Negative for shortness of breath.    Cardiovascular:  Negative for chest pain.   Gastrointestinal:  Negative for abdominal pain, constipation, diarrhea, nausea and vomiting.     Current Outpatient Medications   Medication Instructions    albuterol (PROVENTIL/VENTOLIN HFA) 90 mcg/actuation inhaler 1-2 puffs, Inhalation, Every  "6 hours PRN, Rescue    ascorbic acid (vitamin C) (VITAMIN C) 1,000 mg, Oral, Daily    chlorhexidine (PERIDEX) 0.12 % solution chlorhexidine gluconate 0.12 % mouthwash   RINSE AND SPIT WITH 15ML FOR 30 SEC EVERY 12 HOURS    CHONDROITIN SULFATE A SODIUM ORAL Oral    glucosamine/chondroitin/C/Kyrie (GLUCOSAMINE 1500 COMPLEX ORAL) Oral    ibuprofen (ADVIL,MOTRIN) 800 MG tablet ibuprofen 800 mg tablet   TAKE 1 TABLET 3 TIMES A DAY    multivit with minerals/lutein (MULTIVITAMIN 50 PLUS ORAL) multivitamin   1 tab po q day    omega-3 fatty acids/fish oil (FISH OIL-OMEGA-3 FATTY ACIDS) 300-1,000 mg capsule Oral, Daily    simvastatin (ZOCOR) 10 mg, Oral, Nightly    telmisartan (MICARDIS) 20 mg, Oral, Daily    ubidecarenone (ULTRA COQ10 ORAL) Oral    vitamin E (AQUASOL E, D-ALPHA TOCOPHEROL, ORAL) vitamin E   2 po q day      Past Medical History:   Diagnosis Date    Essential hypertension 09/07/2021    GERD (gastroesophageal reflux disease)     Mixed hyperlipidemia     EDUARDO on CPAP     Thrombocytopenia 7/27/2023      Objective:      Vitals:    05/16/24 0750   BP: 114/72   Pulse: 81   SpO2: 95%   Weight: 135.5 kg (298 lb 9.8 oz)   Height: 5' 10" (1.778 m)     Body mass index is 42.85 kg/m².    Physical Exam   Constitutional:       General: No acute distress.  HENT:      Head: Normocephalic and atraumatic.   Pulmonary:      Effort: Pulmonary effort is normal. No respiratory distress.   Neurological:      General: No focal deficit present.      Mental Status: Alert and oriented to person, place, and time. Mental status is at baseline.    Assessment:       1. Risk for coronary artery disease between 10% and 20% in next 10 years per Boston score    2. Mixed hyperlipidemia        Chiquita Marte MD  [facility]  Office: [phone]   Fax: [phone]  05/16/2024      Disclaimer: This note was partly generated using dictation software which may occasionally result in transcription errors.    Total " time spent on this encounter includes face to face time and non-face to face time preparing to see the patient (eg, review of tests), obtaining and/or reviewing separately obtained history, documenting clinical information in the electronic or other health record, independently interpreting results, and communicating results to the patient/family/caregiver, or care coordinator.

## 2024-05-21 NOTE — PROGRESS NOTES
Subjective:       Patient ID: Raul Acevedo Sr. is a 68 y.o. male.    Chief Complaint: Cough (CHEST CONGESTION )    Cough  Associated symptoms include a fever (wakes up sweating. up to 101.4), rhinorrhea and shortness of breath. Pertinent negatives include no chest pain, headaches, sore throat or wheezing.     New patient to me, here for urgent care visit, with concerns for URI. States onset the past few days. Fever, cough, chest congestion. States he has felt more dehydrated. Urine darker than usual. See ROS    The following portion of the patients history was reviewed and updated as appropriate: allergies, current medications, past medical and surgical history. Past social history and problem list reviewed. Family PMH and Past social history reviewed. Tobacco, Illicit drug use reviewed.      Review of patient's allergies indicates:  No Known Allergies      Current Outpatient Medications:     ascorbic acid, vitamin C, (VITAMIN C) 1000 MG tablet, Take 1,000 mg by mouth once daily., Disp: , Rfl:     CHONDROITIN SULFATE A SODIUM ORAL, Take by mouth., Disp: , Rfl:     glucosamine/chondroitin/C/Kyrie (GLUCOSAMINE 1500 COMPLEX ORAL), Take by mouth., Disp: , Rfl:     ibuprofen (ADVIL,MOTRIN) 800 MG tablet, ibuprofen 800 mg tablet  TAKE 1 TABLET 3 TIMES A DAY, Disp: , Rfl:     multivit with minerals/lutein (MULTIVITAMIN 50 PLUS ORAL), multivitamin  1 tab po q day, Disp: , Rfl:     omega-3 fatty acids/fish oil (FISH OIL-OMEGA-3 FATTY ACIDS) 300-1,000 mg capsule, Take by mouth once daily., Disp: , Rfl:     simvastatin (ZOCOR) 10 MG tablet, Take 1 tablet (10 mg total) by mouth every evening., Disp: 90 tablet, Rfl: 3    telmisartan (MICARDIS) 20 MG Tab, Take 1 tablet (20 mg total) by mouth once daily., Disp: 30 tablet, Rfl: 11    ubidecarenone (ULTRA COQ10 ORAL), Take by mouth., Disp: , Rfl:     vitamin E (AQUASOL E, D-ALPHA TOCOPHEROL, ORAL), vitamin E  2 po q day, Disp: , Rfl:     albuterol (PROVENTIL/VENTOLIN HFA) 90  "mcg/actuation inhaler, Inhale 1-2 puffs into the lungs every 6 (six) hours as needed for Wheezing or Shortness of Breath. Rescue (Patient not taking: Reported on 5/21/2024), Disp: 6.7 g, Rfl: 0    chlorhexidine (PERIDEX) 0.12 % solution, chlorhexidine gluconate 0.12 % mouthwash  RINSE AND SPIT WITH 15ML FOR 30 SEC EVERY 12 HOURS (Patient not taking: Reported on 5/3/2024), Disp: , Rfl:     fish,bora,flax oils-om3,6,9no1 (OMEGA 3-6-9) 1,200 mg Cap, 2 tab po q day, Disp: , Rfl:     Past Medical History:   Diagnosis Date    Essential hypertension 09/07/2021    GERD (gastroesophageal reflux disease)     Mixed hyperlipidemia     EDUARDO on CPAP     Thrombocytopenia 7/27/2023       Past Surgical History:   Procedure Laterality Date    CARPAL TUNNEL RELEASE Bilateral 2005    CERVICAL SPINE SURGERY  1999    w/ Dr. Cruz in [place], "cadaver bone placed"    COLONOSCOPY      COLONOSCOPY N/A 06/18/2021    Procedure: COLONOSCOPY; routine screen; last 10 yrs ago; wnl. Needs update; please ask Dr Solis to perform;  Surgeon: Brent Solis MD;  Location: [facility];  Service: Endoscopy;  Laterality: N/A;       Social History     Socioeconomic History    Marital status:      Spouse name: Velvet    Number of children: 1   Occupational History    Occupation:       Employer: [employer]   Tobacco Use    Smoking status: Never    Smokeless tobacco: Never   Substance and Sexual Activity    Alcohol use: Yes     Comment: Occasionally w special events though    Drug use: Never    Sexual activity: Yes     Partners: Female     Review of Systems   Constitutional:  Positive for fever (wakes up sweating. up to 101.4). Negative for fatigue.   HENT:  Positive for rhinorrhea. Negative for sore throat.         Taking cough medication. More SOB   Eyes:  Negative for visual disturbance.   Respiratory:  Positive for cough (productive, clear) and shortness of breath. Negative for chest tightness and wheezing.  " "  Cardiovascular:  Negative for chest pain, palpitations and leg swelling.   Gastrointestinal:  Negative for abdominal pain, blood in stool, diarrhea, nausea and vomiting.        Darker urine   Genitourinary: Negative.    Musculoskeletal:  Negative for arthralgias, back pain and gait problem.   Skin: Negative.    Neurological:  Negative for headaches.   Psychiatric/Behavioral:  Negative for dysphoric mood and sleep disturbance. The patient is not nervous/anxious.        Objective:      /66   Pulse 93   Temp 97.3 °F (36.3 °C)   Resp 20   Ht 5' 10" (1.778 m)   Wt 134.3 kg (296 lb 1.2 oz)   SpO2 99%   BMI 42.48 kg/m²      Physical Exam  Constitutional:       Appearance: Normal appearance. He is morbidly obese.   HENT:      Head: Normocephalic.      Right Ear: Tympanic membrane, ear canal and external ear normal.      Left Ear: Tympanic membrane, ear canal and external ear normal.      Nose: Congestion and rhinorrhea present.      Mouth/Throat:      Mouth: Mucous membranes are moist.      Pharynx: No posterior oropharyngeal erythema.   Eyes:      Pupils: Pupils are equal, round, and reactive to light.   Neck:      Thyroid: No thyromegaly.      Vascular: No carotid bruit.   Cardiovascular:      Rate and Rhythm: Normal rate and regular rhythm.      Pulses: Normal pulses.      Heart sounds: Normal heart sounds. No murmur heard.  Pulmonary:      Effort: Pulmonary effort is normal.      Breath sounds: Wheezing (bilateral end exp wheeze) present. No decreased breath sounds.   Abdominal:      General: Bowel sounds are normal.      Tenderness: There is no abdominal tenderness.   Musculoskeletal:         General: Normal range of motion.      Cervical back: Normal range of motion.      Comments: Gait normal.  strong, equal   Skin:     General: Skin is warm and dry.      Capillary Refill: Capillary refill takes less than 2 seconds.   Neurological:      General: No focal deficit present.      Mental Status: He is " alert.   Psychiatric:         Attention and Perception: Attention and perception normal.         Mood and Affect: Mood and affect normal.         Speech: Speech normal.         Behavior: Behavior normal.         Assessment:       1. Bronchitis with bronchospasm    2. Wheezing    3. Dark urine        Plan:       Bronchitis with bronchospasm  -     dexAMETHasone injection 8 mg:  Risks and benefits discussed. Potential side effects such as anxiety, palpitations and flushing discussed. May increase blood glucose levels over the next 48 hours. Potential for skin atrophy at injection site. Tolerated injection well.  -     POCT COVID-19 Rapid Screening: negative    Wheezing: has inhaler at home, use as directed.    Dark urine: dip urine. Needs to hydrate well.  If not clearing up will need follow up  -     POCT Urinalysis(Instrument)    Other orders  -     amoxicillin-clavulanate 875-125mg (AUGMENTIN) 875-125 mg per tablet; Take 1 tablet by mouth 2 (two) times daily. for 10 days  Dispense: 20 tablet; Refill: 0  -        benzonatate (TESSALON) 200 MG capsule; Take 1 capsule (200 mg total) by mouth 3 (three) times daily as needed for Cough.  Dispense: 30 capsule; Refill: 0       Continue current medication  Take medications only as prescribed  Healthy diet, exercise  Adequate rest  Adequate hydration  Avoid allergens  Avoid excessive caffeine     Follow up with PCP if not improving

## 2024-05-24 PROBLEM — R93.89 ABNORMAL CT OF THE CHEST: Status: ACTIVE | Noted: 2024-01-01

## 2024-05-24 PROBLEM — R06.00 DYSPNEA: Status: ACTIVE | Noted: 2024-01-01

## 2024-05-24 PROBLEM — R91.8 PULMONARY NODULES: Status: ACTIVE | Noted: 2024-01-01

## 2024-05-24 PROBLEM — K76.89 LIVER NODULE: Status: ACTIVE | Noted: 2024-01-01

## 2024-05-24 NOTE — TELEPHONE ENCOUNTER
----- Message from Wanda Betancur sent at 5/24/2024 12:19 PM CDT -----  Regarding: broken inhaler  Contact: pt  Type:  Needs Medical Advice    Who Called: pt's wife    Best Call Back Number: [phone]    Additional Information  pt's    albuterol (PROVENTIL/VENTOLIN HFA) 90 mcg/actuation inhaler  Is not working anymore.  The pharmacy will not replace the broken inhaler.  Pt is requesting a replacement      [id number] - [place] - [id number] [address] [address]  [id number] [address] [facility] [address]  [place] [address]  Phone: [phone] Fax: [phone]

## 2024-05-28 NOTE — TELEPHONE ENCOUNTER
Pts wife called stating that pts albuterol inhaler is defective and stopped working, so pt has no inhaler.  They called the pharmacy to notify them and pharmacy said they cannot refill it as it is too soon.  Pharmacy told them to call the .  Pts wife is asking you send in a refill of the inhaler.  Explained to pts wife that we have no control of the pharmacy and cannot make them refill it and even if you did send in refill, ins would still deny it as it is too soon to fill again.  Explained to pts wife that she can call the ins and explain to them what happened, or pay cash for a new inhaler. Informed pts wife that I will notify you and see if maybe you can call in a different inhaler that is comparable, but still not sure if ins would pay.  Pts wife verbalizes understanding.

## 2024-05-28 NOTE — TELEPHONE ENCOUNTER
I am not the prescriber of his inhaler.  I would forward the request to his PCP for her to refill or change if she feels it is appropriate.

## 2024-05-28 NOTE — TELEPHONE ENCOUNTER
LVM with contact number to discuss new patient apt with Dr. Webb. Orders placed for labs, PET scan. Referral for hemonc placed. Chart reviewed. Added self to care team.     ----- Message from Lita Alvarado NP sent at 5/28/2024  1:31 PM CDT -----  Regarding: Follow up hospital, liver biopsy  Pt had liver Biopsy today. He will need follow up appt for results with Dr. Webb, OP PET Scan, Labs CBC, CMP. Thanks!

## 2024-05-28 NOTE — NURSING
Spoke with patient's wife, reviewed scheduled appointments, discussed PET scan, wife verbalized agreement to time/date/location. Ms. Acevedo was given my information to call if she had any questions or concerns.  All questions and concerns addressed at this time. Will continue to follow.

## 2024-05-28 NOTE — TELEPHONE ENCOUNTER
----- Message from Wanda Stanley MA sent at 5/28/2024  2:30 PM CDT -----  Stacey carvalho [facility]  Caller: Unspecified (Today,  2:26 PM)  Type: Needs Medical Advice  Who Called:  Jewell with [facility]  Symptoms (please be specific):    How long has patient had these symptoms:    Pharmacy name and phone #:    Best Call Back Number: [phone]  Additional Information: Patient is needing a call back to schedule a 1 wk f/u from Hosp. visit. with Dr. Webb.      Can you help me with this please and thank you

## 2024-05-28 NOTE — NURSING
Oncology Navigation   Intake  Cancer Type: Cancer of Unknown Origin/Diagnosis Clinic  Type of Referral: Internal  Date of Referral: 05/28/24  Initial Nurse Navigator Contact: 05/28/24  Referral to Initial Contact Timeline (days): 0  First Appointment Available: 06/07/24  Appointment Date: 06/07/24  First Available Date vs. Scheduled Date (days): 0     Treatment  Current Status: Staging work-up    Medical Oncologist: Dr. Robert Webb  Consult Date: 06/07/24    Procedures: Biopsy; CT; Ultrasound  Biopsy Schedule Date: 05/28/24  CT Schedule Date: 05/24/24  Ultrasound Schedule Date: 05/25/24     Acuity  Treatment Tolerability: Has not started treatment yet/treatment fully completed and side effects resolved  Hospitalization Within the Past Month: 1   Needed: 0  History of noncompliance/frequent no shows and cancellations: 0  Verbalizes the need for more education: 1  Navigation Acuity: 4     Follow Up  6/7/2024     Pet scan, CBC/CMP scheduled prior to apt with Dr. Webb.

## 2024-05-29 PROBLEM — J18.9 PNEUMONIA: Status: ACTIVE | Noted: 2024-01-01

## 2024-05-29 PROBLEM — Z75.8 DISCHARGE PLANNING ISSUES: Status: ACTIVE | Noted: 2024-01-01

## 2024-05-29 PROBLEM — R50.9 FEVER: Status: ACTIVE | Noted: 2024-01-01

## 2024-05-29 PROBLEM — Z71.89 ACP (ADVANCE CARE PLANNING): Status: ACTIVE | Noted: 2021-06-18

## 2024-05-29 PROBLEM — R10.9 ABDOMINAL PAIN: Status: ACTIVE | Noted: 2024-01-01

## 2024-05-29 NOTE — TELEPHONE ENCOUNTER
New prescription sent to pharmacy - changed slightly so hopefully insurance will cover it.    Ralu was seen today for inhaler.    Diagnoses and all orders for this visit:    Dyspnea, unspecified type  -     albuterol (PROVENTIL/VENTOLIN HFA) 90 mcg/actuation inhaler; Inhale 1-2 puffs into the lungs every 6 (six) hours as needed for Wheezing or Shortness of Breath. Rescue        Chiquita Marte MD  [facility]  Office: [phone]   Fax: [phone]  05/28/2024

## 2024-05-29 NOTE — NURSING
Ms. Acevedo called stating the patient has a temperature and pain since last night, she wanted to know if she could bring him in to see the physician here.  I suggested she take him to the emergency room, since we would be unable to treat him at the clinic and he would more than likely need labs.  She verbalized agreement and stated that the nurse at his PCP's office told her the same thing. She asked if we could direct admit, I explained that since he has yet to establish care we could not do that.  She stated she understood and would take Mr. Acevedo to the emergency room. Will continue to follow.

## 2024-05-31 NOTE — NURSING
Chart review. Pt remains in patient at [facility], will continue to follow.

## 2024-06-03 NOTE — NURSING
Pt discharged from [facility] 6/1, PET scheduled for 6/5, NP appointment scheduled with Dr. Webb 6/7, pt is aware of these appointment times/dates/locaation, made prior to hospital stay. Will continue to follow with patient.

## 2024-06-04 NOTE — PROGRESS NOTES
THIS DOCUMENT WAS MADE IN PART WITH VOICE RECOGNITION SOFTWARE.  OCCASIONALLY THIS SOFTWARE WILL MISINTERPRET WORDS OR PHRASES.     Assessment and Plan:    Hospital discharge follow-up  Comments:  Stable  Hospital visits and diagnostic studies reviewed    Pneumonia due to infectious organism, unspecified laterality, unspecified part of lung  Comments:  Improving  Albuterol inhaler as needed  Complete Augmentin as prescribed  Tessalon Perles as needed for cough    Dyspnea, unspecified type    Liver nodule    Pulmonary nodules    RUQ abdominal pain    Acute cough  -     benzonatate (TESSALON) 200 MG capsule; Take 1 capsule (200 mg total) by mouth 3 (three) times daily as needed for Cough.  Dispense: 30 capsule; Refill: 0             Visit summary:  Hospital course and diagnostic studies reviewed in detail with patient during today's visit.    Patient advised to keep follow up with oncology    Patient to repeat CBC and CMP.    Advised on diagnosis, medications and symptomatic treatment.    Patient reported intermittent right upper quadrant abdominal pain- able to relief with position change.  Recommended he avoid Tylenol, he will take ibuprofen if needed.     Emergent conditions/ER precautions discussed.    Recommend close follow up in 2 weeks    Follow up in about 2 weeks (around 6/18/2024) for Follow-up with PCP.   ______________________________________________________________________  Subjective:    Chief Complaint:  Hospital follow up    HPI:  Raul is a 68 y.o. year old male with a past medical history noted below, here for hospital follow.  He reports being admitted to the hospital twice.  First admitted to [facility] on May 24th, after he presented to the emergency department complaining of cough and dyspnea. Patient reports he was on Augmentin at that time- prescribed MIRZA Daigle, after being seen in clinic.  Chest CT revealed evidence metastatic pulmonary disease in multiple ill-defined hypodensities in the liver.   He was then admitted.  Pulmonary, Oncology and GI was consulted.  Patient's liver enzymes also elevated.  He was discharged  to follow up with Oncology, pulmonology and Interventional Radiology.   Patient was then admitted to [facility] on May 29th for observation after presenting to the ER complaining of abdominal pain, fever and dyspnea.        He is currently taking Augmentin- tolerating well.    Blood pressure medication was stopped in hospital due to hypotension, simvastatin was also discontinued.     He reports some  RUQ pain- able to reposition which helps- taking Tylenol as needed.   Sob- improved- only with exertion- using inhaler and tesslon perrles prn cough-     Does have some decreased appetite- he will be starting ensure nutritional supplements.     Pt has appt with Heme-Onc Dr. Webb on Wed- for labs and PET scan.           Wt Readings from Last 3 Encounters:   06/04/24 122.8 kg (270 lb 13.4 oz)   05/29/24 134.3 kg (296 lb)   05/28/24 133.8 kg (294 lb 15.6 oz)            See hospital courses below:    5/24/24  Hospital Course:   The patient was admitted for further evaluation and treatment of dyspnea. Pulmonology, oncology, and GI were consulted. Pulmonology reviewed the CTA chest and does not believe finding represents PE. No anticoagulation recommended. US abdomen ordered for transaminitis showed no signs of obstruction. IR was consulted for liver biopsy, which was performed on 5/28/24. The patient tolerated the procedure well. Oncology plans for additional outpatient imaging. Transaminitis has been stable and is likely due to metastatic disease. It can be monitored as an outpatient.      The patient was cleared for discharge by oncology, pulmonology, and IR. I discussed the case with Dr. Romero on the day of discharge. The patient feels sufficiently improved to return home. He was monitored for >3 hours after his procedure without undo abdominal pain or signs of bleeding. The patient will be discharged  to home in Good condition. He was told to return to the ER for any worsened pain, bleeding, shortness of breath, fever, or any other concern. He will have close followup with his PCP and Dr. Webb. The patient and Eric Webb, Brayden, and Nick are in agreement with the discharge plan.      5/29/24  Hospital Course:   The patient was admitted for further evaluation and treatment of fever and abdominal pain. Empiric IV antibiotics were continued for pneumonia (elevated procal, dyspnea). Pain resolved in the ER and did not recur. Fevers did not recur. Cultures remained negative. The patient quickly felt completely back to baseline (as happened last admission). He did not qualify for home oxygen based on home O2 determination test. He will complete an outpatient PO antibiotic course.     The patient feels sufficiently improved to return home and is requesting to leave. I offered further observation overnight, but he prefers to return home. The patient will be discharged to home in Good condition. He was told to return to the ER for any worsened pain, chest pain, shortness of breath, fever, or any other concern. He will have close followup with the [facility], oncology, and PCP. The patient and his wife are in agreement with the discharge plan.        Medications:  Current Outpatient Medications on File Prior to Visit   Medication Sig Dispense Refill    albuterol (PROVENTIL/VENTOLIN HFA) 90 mcg/actuation inhaler Inhale 1-2 puffs into the lungs every 6 (six) hours as needed for Wheezing or Shortness of Breath. Rescue 6.7 g 0    amoxicillin-clavulanate 875-125mg (AUGMENTIN) 875-125 mg per tablet Take 1 tablet by mouth every 12 (twelve) hours. 14 tablet 0    ascorbic acid, vitamin C, (VITAMIN C) 1000 MG tablet Take 1,000 mg by mouth once daily.      glucos sul 2KCl/msm/chond/C/Mn (GLUCOSAMINE CHONDROITIN ORAL) Take 1 tablet by mouth 2 (two) times a day. 1500 mg/ 1200 mg      multivit-min/folic/vit K/lycop (MEN'S 50  "PLUS MULTIVITAMIN ORAL) Take 1 tablet by mouth once daily.      vitamin E acetate (VITAMIN E ORAL) Take 1 capsule by mouth 2 (two) times a day.      [DISCONTINUED] benzonatate (TESSALON) 200 MG capsule Take 200 mg by mouth 3 (three) times daily as needed for Cough.      tetrahydrozoline HCl/zinc sulf (RELIEF EYE DROPS OPHT) Place 1 drop into both eyes daily as needed (for redness). (Patient not taking: Reported on 6/4/2024)       No current facility-administered medications on file prior to visit.       Review of Systems:  Review of Systems   Constitutional:  Positive for activity change, appetite change, fatigue and unexpected weight change. Negative for fever.   Respiratory:  Positive for cough and shortness of breath.    Cardiovascular:  Negative for chest pain, palpitations and leg swelling.   Gastrointestinal:  Positive for abdominal pain. Negative for abdominal distention, constipation, diarrhea, nausea and vomiting.   All other systems reviewed and are negative.      Past Medical History:  Past Medical History:   Diagnosis Date    Essential hypertension 09/07/2021    GERD (gastroesophageal reflux disease)     Liver nodule     Lung nodules     Mixed hyperlipidemia     EDUARDO on CPAP     Thrombocytopenia 07/27/2023       Objective:    Vitals:  Vitals:    06/04/24 0929   BP: 110/80   Pulse: 102   SpO2: 96%   Weight: 122.8 kg (270 lb 13.4 oz)   Height: 5' 10" (1.778 m)   PainSc:   5       Physical Exam  Vitals and nursing note reviewed.   Constitutional:       General: He is not in acute distress.     Appearance: He is obese. He is ill-appearing.   HENT:      Head: Normocephalic.      Nose: Nose normal.      Mouth/Throat:      Mouth: Mucous membranes are moist.      Pharynx: Oropharynx is clear.   Eyes:      General: No scleral icterus.  Cardiovascular:      Rate and Rhythm: Normal rate.   Pulmonary:      Effort: Pulmonary effort is normal. No respiratory distress.      Breath sounds: Wheezing (slight expiratory) " present. No rhonchi or rales.   Abdominal:      General: Bowel sounds are normal. There is no distension.      Palpations: Abdomen is soft.      Tenderness: There is no abdominal tenderness. There is no right CVA tenderness or left CVA tenderness.   Musculoskeletal:      Cervical back: Neck supple.      Right lower leg: No edema.      Left lower leg: No edema.   Skin:     General: Skin is warm and dry.   Neurological:      Mental Status: He is alert and oriented to person, place, and time.   Psychiatric:         Mood and Affect: Mood normal.         Behavior: Behavior normal.         Thought Content: Thought content normal.         Data:  CMP  Sodium   Date Value Ref Range Status   06/01/2024 134 (L) 136 - 145 mmol/L Final     Potassium   Date Value Ref Range Status   06/01/2024 4.6 3.5 - 5.1 mmol/L Final     Comment:     Anion Gap reference range revised on 4/28/2023     Chloride   Date Value Ref Range Status   06/01/2024 106 95 - 110 mmol/L Final     CO2   Date Value Ref Range Status   06/01/2024 22 22 - 31 mmol/L Final     Glucose   Date Value Ref Range Status   06/01/2024 119 (H) 70 - 110 mg/dL Final     Comment:     The ADA recommends the following guidelines for fasting glucose:    Normal:       less than 100 mg/dL    Prediabetes:  100 mg/dL to 125 mg/dL    Diabetes:     126 mg/dL or higher       BUN   Date Value Ref Range Status   06/01/2024 20 9 - 21 mg/dL Final     Creatinine   Date Value Ref Range Status   06/01/2024 0.67 0.50 - 1.40 mg/dL Final     Calcium   Date Value Ref Range Status   06/01/2024 8.3 (L) 8.4 - 10.2 mg/dL Final     Total Protein   Date Value Ref Range Status   06/01/2024 5.5 (L) 6.0 - 8.4 g/dL Final     Albumin   Date Value Ref Range Status   06/01/2024 2.5 (L) 3.5 - 5.2 g/dL Final     Total Bilirubin   Date Value Ref Range Status   06/01/2024 1.7 (H) 0.2 - 1.3 mg/dL Final     Alkaline Phosphatase   Date Value Ref Range Status   06/01/2024 113 38 - 145 U/L Final     AST   Date Value Ref  Range Status   06/01/2024 152 (H) 17 - 59 U/L Final     ALT   Date Value Ref Range Status   06/01/2024 72 (H) 0 - 50 U/L Final     Anion Gap   Date Value Ref Range Status   06/01/2024 6 5 - 12 mmol/L Final     Comment:     Anion Gap reference range revised on 4/28/2023     eGFR   Date Value Ref Range Status   06/01/2024 >60 >60 mL/min/1.73 m^2 Final      Lab Results   Component Value Date    WBC 18.65 (H) 06/01/2024    HGB 8.4 (L) 06/01/2024    HCT 25.9 (L) 06/01/2024    MCV 86 06/01/2024     06/01/2024           Medical history reviewed, Medications reconciled.              DORIS MoreP-C  Family Medicine

## 2024-06-05 NOTE — PROGRESS NOTES
"PET Imaging Questionnaire    Are you a Diabetic? Recent Blood Sugar level? No    Are you anemic? Bone Marrow Stimulation Meds? No    Have you had a CT Scan, if so when & where was your last one? Yes -     Have you had a PET Scan, if so when & where was your last one? No    Chemotherapy or currently on Chemotherapy? No    Radiation therapy? No    Surgical History:   Past Surgical History:   Procedure Laterality Date    CARPAL TUNNEL RELEASE Bilateral 2005    CERVICAL SPINE SURGERY  1999    w/ Dr. Cruz in [place], "cadaver bone placed"    COLONOSCOPY      COLONOSCOPY N/A 06/18/2021    Procedure: COLONOSCOPY; routine screen; last 10 yrs ago; wnl. Needs update; please ask Dr Solis to perform;  Surgeon: Brent Solis MD;  Location: [facility];  Service: Endoscopy;  Laterality: N/A;        Have you been fasting for at least 6 hours? Yes    Is there any chance you may be pregnant or breastfeeding? No    Assay: 13.1 MCi@:10:29   Injection Site:RT AC    Residual: 0.5 mCi@: 10:33   Technologist: Raul Schultz Injected:12.6 mCi     "

## 2024-06-06 NOTE — PROGRESS NOTES
PATIENT: Raul Acevedo Sr.  MRN: [id number]  DATE: 6/7/2024      Diagnosis:   1. Malignant neoplasm metastatic to both lungs    2. Secondary carcinoma of lung, unspecified laterality    3. 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 Metastasis to bone    5. Anorexia    6. Shortness of breath    7. Hypoxemia    8. Cough, unspecified type    9. Debility    10. Anxiety    11. Hyperbilirubinemia    12. Normocytic anemia    13. Gastroesophageal reflux disease, unspecified whether esophagitis present        Chief Complaint: Establish Care (Metastatic Cancer of Unknown Primary)      Oncologic History:      Oncologic History     Oncologic Treatment     Pathology           Subjective:    Initial History: Mr. Acevedo is a 68 y.o. male with HTN, GERD, HLD, thrombocytopenia who presents for poorly differentiated carcinoma.  The patient initially presented to the hospital with shortness of breath.  CTA chest on 05/24/2024 showed questionable filling defect versus artifact in the left upper lobe segment pulmonary artery; scattered subcentimeter mediastinal, left hilar and axillary lymph nodes; numerous bilateral pulmonary nodules concerning for metastatic disease; few prominent upper abdominal perigastric lymph nodes concerning for metastatic disease.  Ultrasound abdomen 05/25/2024 showed multiple ovoid lesions within the left and right hepatic lobe with the largest in the left hepatic lobe measuring 8.1 x 8.1 x 10.6 cm in the largest in the right hepatic lobe measuring 8.2 x 9.5 and 9.6 cm; soft tissue nodule measuring 3.4 x 2.5 x 2.6 cm in the region of pancreatic head.  The patient underwent liver biopsy on 05/28/2024 with path showing poorly differentiated carcinoma.  PET-CT on 06/05/2024 showed innumerable diffuse bilateral hypermetabolic mass in the right middle lobe measuring 3.9 x 3.4 cm; left upper lobe mass measuring 3.4 x 3.3 cm; right hilar lymph node; right axillary lymph node; multiple enlarged  "periportal, perigastric and periaortic lymph nodes; several hypermetabolic lesions suspicious for metastases in the bones with a right humeral diaphysis lesion and left ischial lesion.    Currently the patient endorses shortness of breath with exertion and an associated cough.  The patient also endorses reflux.  He endorses decreased weight and decrease in appetite.  His urine has recently become darker than usual.  The patient denies CP, abdominal pain, nausea, vomiting, constipation, diarrhea.  The patient denies fever, chills, night sweats, new lumps or bumps, easy bruising or bleeding.    Past Medical History:   Past Medical History:   Diagnosis Date    Essential hypertension 2021    GERD (gastroesophageal reflux disease)     Liver nodule     Lung nodules     Mixed hyperlipidemia     EDUARDO on CPAP     Thrombocytopenia 2023       Past Surgical HIstory:   Past Surgical History:   Procedure Laterality Date    CARPAL TUNNEL RELEASE Bilateral 2005    CERVICAL SPINE SURGERY      w/ Dr. Cruz in [place], "cadaver bone placed"    COLONOSCOPY      COLONOSCOPY N/A 2021    Procedure: COLONOSCOPY; routine screen; last 10 yrs ago; wnl. Needs update; please ask Dr Solis to perform;  Surgeon: Brent Solis MD;  Location: [facility];  Service: Endoscopy;  Laterality: N/A;       Family History:   Family History   Problem Relation Name Age of Onset    Diabetes Mother      Heart disease Mother          coronary stents; first at her mid-70's.     Diabetes Father      Heart disease Father          Dad  of MI at 50.     Early death Father           of MI at 50's mid    Hyperlipidemia Sister      Hypertension Sister      Hyperlipidemia Sister      Hypertension Brother      Diabetes Brother      Pancreatic cancer Paternal Grandmother      Cancer Neg Hx         Social History:  reports that he has never smoked. He has never used smokeless tobacco. He reports current alcohol use. He reports that he " does not use drugs.    Allergies:  Review of patient's allergies indicates:  No Known Allergies    Medications:  Current Outpatient Medications   Medication Sig Dispense Refill    albuterol (PROVENTIL/VENTOLIN HFA) 90 mcg/actuation inhaler Inhale 1-2 puffs into the lungs every 6 (six) hours as needed for Wheezing or Shortness of Breath. Rescue 6.7 g 0    amoxicillin-clavulanate 875-125mg (AUGMENTIN) 875-125 mg per tablet Take 1 tablet by mouth every 12 (twelve) hours. 14 tablet 0    ascorbic acid, vitamin C, (VITAMIN C) 1000 MG tablet Take 1,000 mg by mouth once daily.      glucos sul 2KCl/msm/chond/C/Mn (GLUCOSAMINE CHONDROITIN ORAL) Take 1 tablet by mouth 2 (two) times a day. 1500 mg/ 1200 mg      multivit-min/folic/vit K/lycop (MEN'S 50 PLUS MULTIVITAMIN ORAL) Take 1 tablet by mouth once daily.      tetrahydrozoline HCl/zinc sulf (RELIEF EYE DROPS OPHT) Place 1 drop into both eyes daily as needed (for redness).      vitamin E acetate (VITAMIN E ORAL) Take 1 capsule by mouth 2 (two) times a day.      albuterol (PROVENTIL) 5 mg/mL nebulizer solution Take 0.5 mLs (2.5 mg total) by nebulization every 6 (six) hours as needed for Wheezing. Rescue 20 each 6    ALPRAZolam (XANAX) 0.25 MG tablet Take 1 tablet (0.25 mg total) by mouth 2 (two) times daily as needed for Anxiety. 90 tablet 0    benzonatate (TESSALON) 100 MG capsule Take 2 capsules (200 mg total) by mouth 3 (three) times daily as needed for Cough. 90 capsule 6    cyproheptadine (PERIACTIN) 4 mg tablet Take 1 tablet (4 mg total) by mouth 4 (four) times daily. 120 tablet 4    pantoprazole (PROTONIX) 40 MG tablet Take 1 tablet (40 mg total) by mouth once daily. 30 tablet 1     No current facility-administered medications for this visit.       Review of Systems   Constitutional:  Positive for appetite change and unexpected weight change. Negative for chills and fever.   HENT:  Negative for sore throat and trouble swallowing.    Eyes:  Negative for photophobia and  "visual disturbance.   Respiratory:  Positive for cough and shortness of breath. Negative for chest tightness.    Cardiovascular:  Negative for chest pain, palpitations and leg swelling.   Gastrointestinal:  Negative for abdominal pain, constipation, diarrhea, nausea and vomiting.        Reflux   Endocrine: Negative for cold intolerance and heat intolerance.   Genitourinary:  Negative for difficulty urinating, dysuria and hematuria.        Dark urine   Musculoskeletal:  Negative for arthralgias, back pain and myalgias.   Skin:  Negative for color change and rash.   Neurological:  Negative for dizziness, light-headedness, numbness and headaches.   Hematological:  Negative for adenopathy. Does not bruise/bleed easily.       ECOG Performance Status: 3   Objective:      Vitals:   Vitals:    06/07/24 0755   BP: 100/68   BP Location: Right arm   Patient Position: Sitting   BP Method: Large (Automatic)   Pulse: (!) 114   Resp: 18   Temp: 98.4 °F (36.9 °C)   SpO2: (!) 90%   Weight: 128 kg (282 lb 3 oz)   Height: 5' 10" (1.778 m)     Physical Exam  Constitutional:       General: He is not in acute distress.     Appearance: He is well-developed. He is ill-appearing. He is not diaphoretic.   HENT:      Head: Normocephalic and atraumatic.   Eyes:      General: No scleral icterus.        Right eye: No discharge.         Left eye: No discharge.   Cardiovascular:      Rate and Rhythm: Normal rate and regular rhythm.      Heart sounds: Normal heart sounds. No murmur heard.     No friction rub. No gallop.   Pulmonary:      Effort: Pulmonary effort is normal. No respiratory distress.      Breath sounds: Normal breath sounds. No wheezing or rales.   Chest:      Chest wall: No tenderness.   Abdominal:      General: Bowel sounds are normal. There is no distension.      Palpations: Abdomen is soft. There is no mass.      Tenderness: There is no abdominal tenderness. There is no rebound.   Skin:     General: Skin is warm and dry.      " Findings: No erythema or rash.   Neurological:      Mental Status: He is alert and oriented to person, place, and time.   Psychiatric:         Behavior: Behavior normal.         Laboratory Data:  Hospital Outpatient Visit on 06/05/2024   Component Date Value Ref Range Status    POC Glucose 06/05/2024 117 (A)  70 - 110 MG/DL Final   Lab Visit on 06/05/2024   Component Date Value Ref Range Status    WBC 06/05/2024 19.49 (H)  3.90 - 12.70 K/uL Final    RBC 06/05/2024 2.96 (L)  4.60 - 6.20 M/uL Final    Hemoglobin 06/05/2024 8.4 (L)  14.0 - 18.0 g/dL Final    Hematocrit 06/05/2024 25.3 (L)  40.0 - 54.0 % Final    MCV 06/05/2024 86  82 - 98 fL Final    MCH 06/05/2024 28.4  27.0 - 31.0 pg Final    MCHC 06/05/2024 33.2  32.0 - 36.0 g/dL Final    RDW 06/05/2024 18.0 (H)  11.5 - 14.5 % Final    Platelets 06/05/2024 169  150 - 450 K/uL Final    MPV 06/05/2024 9.5  9.2 - 12.9 fL Final    Immature Granulocytes 06/05/2024 CANCELED  0.0 - 0.5 % Final    Result canceled by the ancillary.    Immature Grans (Abs) 06/05/2024 CANCELED  0.00 - 0.04 K/uL Final    Comment: Mild elevation in immature granulocytes is non specific and   can be seen in a variety of conditions including stress response,   acute inflammation, trauma and pregnancy. Correlation with other   laboratory and clinical findings is essential.    Result canceled by the ancillary.      nRBC 06/05/2024 1 (A)  0 /100 WBC Final    Gran % 06/05/2024 75.0 (H)  38.0 - 73.0 % Final    Lymph % 06/05/2024 13.0 (L)  18.0 - 48.0 % Final    Mono % 06/05/2024 7.0  4.0 - 15.0 % Final    Eosinophil % 06/05/2024 0.0  0.0 - 8.0 % Final    Basophil % 06/05/2024 0.0  0.0 - 1.9 % Final    Bands 06/05/2024 1.0  % Final    Metamyelocytes 06/05/2024 2.0  % Final    Myelocytes 06/05/2024 2.0  % Final    Platelet Estimate 06/05/2024 Appears normal   Final    Aniso 06/05/2024 Slight   Final    Poly 06/05/2024 Occasional   Final    Differential Method 06/05/2024 Manual   Final    Sodium  06/05/2024 137  136 - 145 mmol/L Final    Potassium 06/05/2024 4.4  3.5 - 5.1 mmol/L Final    Chloride 06/05/2024 106  95 - 110 mmol/L Final    CO2 06/05/2024 19 (L)  23 - 29 mmol/L Final    Glucose 06/05/2024 101  70 - 110 mg/dL Final    BUN 06/05/2024 22  8 - 23 mg/dL Final    Creatinine 06/05/2024 0.6  0.5 - 1.4 mg/dL Final    Calcium 06/05/2024 8.7  8.7 - 10.5 mg/dL Final    Total Protein 06/05/2024 5.6 (L)  6.0 - 8.4 g/dL Final    Albumin 06/05/2024 2.0 (L)  3.5 - 5.2 g/dL Final    Total Bilirubin 06/05/2024 2.4 (H)  0.1 - 1.0 mg/dL Final    Comment: For infants and newborns, interpretation of results should be based  on gestational age, weight and in agreement with clinical  observations.    Premature Infant recommended reference ranges:  Up to 24 hours.............<8.0 mg/dL  Up to 48 hours............<12.0 mg/dL  3-5 days..................<15.0 mg/dL  6-29 days.................<15.0 mg/dL      Alkaline Phosphatase 06/05/2024 125  55 - 135 U/L Final    AST 06/05/2024 158 (H)  10 - 40 U/L Final    ALT 06/05/2024 70 (H)  10 - 44 U/L Final    eGFR 06/05/2024 >60.0  >60 mL/min/1.73 m^2 Final    Anion Gap 06/05/2024 12  8 - 16 mmol/L Final   No results displayed because visit has over 200 results.            Imaging:     CTA chest 05/24/2024   There is suboptimal contrast opacification of the pulmonary arterial system which along with cardiac motion and volume averaging artifact limits assessment. There is questionable filling defect versus artifact in a left upper lobe segmental pulmonary arterial branch on image 146 of series 6. No definite central pulmonary arterial filling defects to suggest central pulmonary embolus is visualized. The cardiac size appears to be within normal limits. Atherosclerotic vascular calcifications are seen including scattered coronary and aortic calcifications. Scattered subcentimeter in short axis dimension mediastinal, left hilar, and axillary lymph nodes are noted. There is either  an enlarged lymph node in the posterior right hilum versus adjacent pleural based nodule on image 144 of series 6 measuring approximately 12 mm in short axis. There are numerous bilateral pulmonary nodules concerning for metastatic disease. A reference nodule in the right upper lobe measures approximately 3.1 x 2.4 cm. A reference nodule in the left upper lobe measures approximately 2.8 x 2.2 cm axially on image 34 of series 5. A reference left lower lobe nodule on image 48 of series 5 measures approximately 2.4 x 1.9 cm axially. A reference right lower lobe nodule on image 36 of series 5 measures 3.6 x 2.5 cm. The central airways appear grossly patent. No evidence of lobar type consolidation is seen. Minimal dependent changes in the lung bases are noted. No significant pleural effusion is seen. The included early arterial phase upper abdomen demonstrates no definite acute abnormalities. There appear to be a few mildly prominent upper abdominal perigastric lymph nodes. For example, a 2.1 x 1.4 cm lymph node is seen on image 289 of series 6, concerning for metastatic disease. There appear to be celiac axis/nicol pedis enlarged lymph nodes which are concerning for metastatic disease. For example, a lymph node measuring 2.6 x 4.2 cm anterior to the IVC is seen on image 324 of series 6. There appears to be evidence of hypodense lesions within the left and right hepatic lobes concerning for metastatic disease. For example, hypodense lesion within the posterior right hepatic lobe measuring approximately 9 x 4.5 cm is seen on image 311 of series 6. Degenerative changes affect the visualized spine. No suspicious destructive osseous lesions are appreciated by CT.  US abdomen 5/25/24  Multiple transverse and sagittal grayscale sonographic images were acquired through the right upper quadrant. The liver measures approximately 23.4 cm in craniocaudal dimension. There are multiple ovoid lesions within the left and right hepatic  lobe. The largest in the left hepatic lobe measures 8.1 x 8.1 x 10.6 cm. The largest in the right hepatic lobe measures 8.2 x 9.5 x 9.6 cm. The findings are concerning for metastatic disease. The main portal vein demonstrates antegrade flow. The common bile duct measures approximately 4 mm in visualized diameter. No evidence of cholelithiasis, gallbladder wall thickening, or pericholecystic fluid is demonstrated. No sonographic Benitez sign was present per the ultrasound technologist report which should be correlated for any potential analgesic administration. The pancreas is obscured by bowel gas artifact. There is a soft tissue nodule measured at approximately 3.4 x 2.5 x 2.6 cm in the region of the pancreatic head. The right kidney measures approximately 14.2 cm in length. No evidence of hydronephrosis or definite echogenic and shadowing renal calculi is demonstrated. The visualized proximal to mid abdominal aorta is grossly normal in caliber. The distal abdominal aorta is obscured by bowel gas artifact. The proximal visualized IVC appears to be patent.    PET/CT 6/05/24  Quality of the study: Adequate.     In the head and neck, there are no hypermetabolic lesions worrisome for malignancy. There are no hypermetabolic mucosal lesions, and there are no pathologically enlarged or hypermetabolic lymph nodes.     In the chest, there are innumerable diffuse bilateral hypermetabolic pulmonary nodules and masses. Right middle lobe mass measures 3.9 x 3.4 cm (image 100) with maximum SUV of 4.1. Left upper lobe mass measures 3.4 x 3.3 cm (image 78) with maximum SUV of 7. Right hilar lymph node with maximum SUV of 4.8. Few mildly hypermetabolic normal size mediastinal lymph nodes. Right level 3 axillary lymph node measuring 0.7 cm (image 59) with maximum SUV of 3.5.     In the abdomen and pelvis, the liver is enlarged containing innumerable diffuse hypermetabolic hypodense masses. Several hepatic masses demonstrate PET  photopenia.     There are multiple enlarged periportal, perigastric, and para-aortic lymph nodes. Some lymph nodes are hypermetabolic and others demonstrate PET photopenia. 1.6 cm lymph node anterior to the distal stomach (image 171) with maximum SUV of 8.6. 1.2 cm para-aortic lymph node (image 184) with maximum SUV of 7.3 (image 183). Several enlarged periportal and gastrohepatic lymph nodes with PET photopenia can be seen on image 156, 160, and 170.     There is physiologic tracer distribution within the abdominal organs and excretion into the genitourinary system.     In the bones, there are several hypermetabolic lesion suspicious for metastasis. Right humeral diaphysis lesion with maximum SUV of 3.2 (image 14). Left mandible lesion near the TMJ with maximum SUV of 4.4 (image 22). Left ischial lesion with maximum SUV of 5.2 (image 250). Left sacral lesion with maximum SUV of 5.6 (image 220).     Additional CT findings: Coronary artery calcific atherosclerosis. Trace pelvic free fluid.       Assessment:       1. Malignant neoplasm metastatic to both lungs    2. Secondary carcinoma of lung, unspecified laterality    3. 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 Metastasis to bone    5. Anorexia    6. Shortness of breath    7. Hypoxemia    8. Cough, unspecified type    9. Debility    10. Anxiety    11. Hyperbilirubinemia    12. Normocytic anemia    13. Gastroesophageal reflux disease, unspecified whether esophagitis present           Plan:     Metastatic Cancer of Unknown Primary - The patient has widely metastatic poorly differentiated carcinoma with extensive metastases to both lungs; liver; periportal, perigastric, and para-aortic lymph nodes; right humeral diaphysis, left mandible, left ischium and left sacrum  -TEMPUS tissue testing sent for tumor of origin from liver biopsy 5/28/24  -PT needs immediate treatment   -Will have PICC line placed and start pt on Carboplatin and Pacltiaxel  -PT to see  surgeon for eventual PORT placement  -Pt to attend chemo class and to enroll in chemo care companion  -Pt to see palliative care  Visit today included increased complexity associated with the care of the episodic problem (chemotherapy) addressed and managing the longitudinal care of the patient due to the serious and/or complex managed problem(s)  Metastatic Cancer of Unknown Primary .    Anorexia - pt to start on periactin    Hypoxia - Pt oxygen saturation dropped to 86% when ambulating and kaylie to 90% with oxygen on 2L  -Will have pt set up for home O2    Cough - likely due to underlying malignancy  -Will refill tessalon perles    Anxiety - will prescribe ativan 0.25mg as needed  -Pt given low dose given evidence of liver dysfunction    Debility - due to malignancy  -Pt was up and chasing his grand kids a month ago  -Will have pt acquire a wheelchair    Hyperbilirubinemia - Bilirubin 2.4 on 6/05/24  -Will monitor    Normocytic anemia - hemoglobin 8.4g/dL on 6/05/24  -Will check iron studies     GERD - will prescribe protonix    Route Chart for Scheduling    Med Onc Chart Routing      Follow up with physician 1 week. PT needs blood work today with TEMPUS blood test.  Pt needs a PICC line palced early next week.  Pt needs appt with general surgery for PORT palcement.  Pt needs a chemo class.  Pt needs MRI brain scheduled ASAP.  Pt needs urgent approval of chemo and to start next week.  Pt needs a CBC and CMP with MD appt next week.  Pt needs to be set up for chemo care companion.   Follow up with RAOUL    Infusion scheduling note    Injection scheduling note    Labs    Imaging    Pharmacy appointment    Other referrals                Treatment Plan Information   OP NSCLC PACLITAXEL CARBOPLATIN Q3W   Robert Webb MD   Upcoming Treatment Dates - OP NSCLC PACLITAXEL CARBOPLATIN Q3W    6/12/2024       Pre-Medications       diphenhydrAMINE (BENADRYL) 50 mg in NS 50 mL IVPB       famotidine (PF) injection 20 mg        Chemotherapy       PACLitaxeL (TAXOL) 200 mg/m2 = 504 mg in sodium chloride 0.9% 500 mL chemo infusion       CARBOplatin (PARAPLATIN) in sodium chloride 0.9% 250 mL chemo infusion       Antiemetics       aprepitant (CINVANTI) injection 130 mg       palonosetron 0.25mg/dexAMETHasone 20mg in NS IVPB 0.25 mg 50 mL  7/3/2024       Pre-Medications       diphenhydrAMINE (BENADRYL) 50 mg in NS 50 mL IVPB       famotidine (PF) injection 20 mg       Chemotherapy       PACLitaxeL (TAXOL) 200 mg/m2 = 504 mg in sodium chloride 0.9% 500 mL chemo infusion       CARBOplatin (PARAPLATIN) in sodium chloride 0.9% 250 mL chemo infusion       Antiemetics       aprepitant (CINVANTI) injection 130 mg       palonosetron 0.25mg/dexAMETHasone 20mg in NS IVPB 0.25 mg 50 mL  7/24/2024       Pre-Medications       diphenhydrAMINE (BENADRYL) 50 mg in NS 50 mL IVPB       famotidine (PF) injection 20 mg       Chemotherapy       PACLitaxeL (TAXOL) 200 mg/m2 = 504 mg in sodium chloride 0.9% 500 mL chemo infusion       CARBOplatin (PARAPLATIN) in sodium chloride 0.9% 250 mL chemo infusion       Antiemetics       aprepitant (CINVANTI) injection 130 mg       palonosetron 0.25mg/dexAMETHasone 20mg in NS IVPB 0.25 mg 50 mL  8/14/2024       Pre-Medications       diphenhydrAMINE (BENADRYL) 50 mg in NS 50 mL IVPB       famotidine (PF) injection 20 mg       Chemotherapy       PACLitaxeL (TAXOL) 200 mg/m2 = 504 mg in sodium chloride 0.9% 500 mL chemo infusion       CARBOplatin (PARAPLATIN) in sodium chloride 0.9% 250 mL chemo infusion       Antiemetics       aprepitant (CINVANTI) injection 130 mg       palonosetron 0.25mg/dexAMETHasone 20mg in NS IVPB 0.25 mg 50 mL      Robert Webb MD  [facility]  Hematology and Oncology  [facility]   [address] [place] [address]   O: [phone]  F: [phone]

## 2024-06-07 PROBLEM — C78.02 MALIGNANT NEOPLASM METASTATIC TO BOTH LUNGS: Status: ACTIVE | Noted: 2024-01-01

## 2024-06-07 PROBLEM — C78.01 MALIGNANT NEOPLASM METASTATIC TO BOTH LUNGS: Status: ACTIVE | Noted: 2024-01-01

## 2024-06-07 NOTE — NURSING
LVM for patient to setup chemo education, chemo and PICC line placement.  PICC 6/10, EDU 6/11, treatment start date 6/12.

## 2024-06-07 NOTE — TELEPHONE ENCOUNTER
SP02 walk test with patient - baseline 90% on room air. After walking about 6 small  steps dropped to 86% with sign of distress.  Placed on oxygen at 2L and recovered to 90% and no signs of distress after sitting a few minutes.

## 2024-06-07 NOTE — PLAN OF CARE
START OFF PATHWAY REGIMEN - Other            Paclitaxel       Carboplatin     **Always confirm dose/schedule in your pharmacy ordering system**    Patient Characteristics:  Intent of Therapy:  Non-Curative / Palliative Intent, Discussed with Patient

## 2024-06-07 NOTE — PROGRESS NOTES
LCSW followed up on orders for home oxygen and wheelchair. Brandy from [facility] stated that the equipment was out for delivery at the time of my call.

## 2024-06-07 NOTE — PROGRESS NOTES
LCSW received staff message requesting home oxygen be ordered. ALVAREZ faxed all documents to [facility] [place]. RN also requested a wheelchair for the patient. ALVAREZ faxed all documents for this order also to [facility] [place]. Spoke with Brandy regarding O2 and Catie regarding wheelchair. Both faxes were sent prior to 11 AM. ALVAREZ will follow up before the end of the day.

## 2024-06-07 NOTE — TELEPHONE ENCOUNTER
I called patient to see if he would like to schedule lab appt while he was there following an appt with Dr. Webb for Tempus testing. LVM and sending [employer] message. MD was messaged as well.

## 2024-06-09 PROBLEM — J96.01 ACUTE HYPOXEMIC RESPIRATORY FAILURE: Status: ACTIVE | Noted: 2024-01-01

## 2024-06-09 PROBLEM — D50.9 IRON DEFICIENCY ANEMIA: Status: ACTIVE | Noted: 2024-01-01

## 2024-06-09 PROBLEM — R79.89 ELEVATED LFTS: Status: ACTIVE | Noted: 2024-01-01

## 2024-06-10 PROBLEM — D64.9 NORMOCYTIC ANEMIA: Status: RESOLVED | Noted: 2024-01-01 | Resolved: 2024-01-01

## 2024-06-10 PROBLEM — J96.20 ACUTE ON CHRONIC RESPIRATORY FAILURE: Status: ACTIVE | Noted: 2024-01-01

## 2024-06-10 PROBLEM — G93.41 METABOLIC ENCEPHALOPATHY: Status: ACTIVE | Noted: 2024-01-01

## 2024-06-10 PROBLEM — C79.9 METASTATIC ADENOCARCINOMA OF UNKNOWN ORIGIN: Status: ACTIVE | Noted: 2024-01-01

## 2024-06-10 PROBLEM — R79.89 ELEVATED LFTS: Status: RESOLVED | Noted: 2024-01-01 | Resolved: 2024-01-01

## 2024-06-10 PROBLEM — D64.9 ACUTE ON CHRONIC ANEMIA: Status: ACTIVE | Noted: 2024-01-01

## 2024-06-10 PROBLEM — D64.9 NORMOCYTIC ANEMIA: Status: ACTIVE | Noted: 2024-01-01

## 2024-06-10 NOTE — NURSING
Chart review. Pt admitted from ED to ICU 6/10 for hypoxemic respiratory failure.  Will continue to follow.

## 2024-06-11 PROBLEM — E66.9 OBESITY: Status: ACTIVE | Noted: 2024-01-01

## 2024-06-11 NOTE — NURSING
Received call from Mrs. Acevedo, Mr. Acevedo passed away a little after 11:00 am today. Made her aware that grief counseling is available to her should she need it at any time and offered condolences. She thanked me and stated that she would let me know if she needed anything.

## 2024-06-15 LAB
DNA RANGE(S) EXAMINED NAR: NORMAL
GENE DIS ANL INTERP-IMP: NORMAL
GENE DIS ASSESSED: NORMAL
MSI CA SPEC-IMP: NOT DETECTED
REASON FOR STUDY: NORMAL
TEMPUS LCA: NORMAL
TEMPUS PORTAL: NORMAL
TEMPUS TRIAL1: NORMAL
TEMPUS TRIAL2: NORMAL
TEMPUS TRIAL3: NORMAL
TEMPUS TRIALCOUNT: 3